# Patient Record
Sex: FEMALE | NOT HISPANIC OR LATINO | URBAN - METROPOLITAN AREA
[De-identification: names, ages, dates, MRNs, and addresses within clinical notes are randomized per-mention and may not be internally consistent; named-entity substitution may affect disease eponyms.]

---

## 2017-03-31 ENCOUNTER — OUTPATIENT (OUTPATIENT)
Dept: OUTPATIENT SERVICES | Facility: HOSPITAL | Age: 82
LOS: 1 days | End: 2017-03-31
Payer: MEDICARE

## 2017-03-31 DIAGNOSIS — K08.9 DISORDER OF TEETH AND SUPPORTING STRUCTURES, UNSPECIFIED: ICD-10-CM

## 2017-03-31 PROCEDURE — D0140: CPT

## 2017-03-31 PROCEDURE — D0330: CPT

## 2017-04-03 DIAGNOSIS — Z01.20 ENCOUNTER FOR DENTAL EXAMINATION AND CLEANING WITHOUT ABNORMAL FINDINGS: ICD-10-CM

## 2017-04-05 ENCOUNTER — OUTPATIENT (OUTPATIENT)
Dept: OUTPATIENT SERVICES | Facility: HOSPITAL | Age: 82
LOS: 1 days | End: 2017-04-05
Payer: MEDICARE

## 2017-04-05 DIAGNOSIS — K08.9 DISORDER OF TEETH AND SUPPORTING STRUCTURES, UNSPECIFIED: ICD-10-CM

## 2017-04-05 DIAGNOSIS — K02.9 DENTAL CARIES, UNSPECIFIED: ICD-10-CM

## 2017-04-05 PROCEDURE — D7140: CPT

## 2017-04-07 ENCOUNTER — OUTPATIENT (OUTPATIENT)
Dept: OUTPATIENT SERVICES | Facility: HOSPITAL | Age: 82
LOS: 1 days | End: 2017-04-07

## 2017-04-07 DIAGNOSIS — K08.9 DISORDER OF TEETH AND SUPPORTING STRUCTURES, UNSPECIFIED: ICD-10-CM

## 2017-04-10 DIAGNOSIS — K08.109 COMPLETE LOSS OF TEETH, UNSPECIFIED CAUSE, UNSPECIFIED CLASS: ICD-10-CM

## 2017-04-21 ENCOUNTER — OUTPATIENT (OUTPATIENT)
Dept: OUTPATIENT SERVICES | Facility: HOSPITAL | Age: 82
LOS: 1 days | End: 2017-04-21

## 2017-04-21 DIAGNOSIS — K08.9 DISORDER OF TEETH AND SUPPORTING STRUCTURES, UNSPECIFIED: ICD-10-CM

## 2017-04-24 DIAGNOSIS — K08.109 COMPLETE LOSS OF TEETH, UNSPECIFIED CAUSE, UNSPECIFIED CLASS: ICD-10-CM

## 2017-05-08 ENCOUNTER — OUTPATIENT (OUTPATIENT)
Dept: OUTPATIENT SERVICES | Facility: HOSPITAL | Age: 82
LOS: 1 days | End: 2017-05-08

## 2017-05-08 DIAGNOSIS — K08.9 DISORDER OF TEETH AND SUPPORTING STRUCTURES, UNSPECIFIED: ICD-10-CM

## 2017-05-10 DIAGNOSIS — K08.109 COMPLETE LOSS OF TEETH, UNSPECIFIED CAUSE, UNSPECIFIED CLASS: ICD-10-CM

## 2018-07-01 ENCOUNTER — OUTPATIENT (OUTPATIENT)
Dept: OUTPATIENT SERVICES | Facility: HOSPITAL | Age: 83
LOS: 1 days | End: 2018-07-01
Payer: MEDICARE

## 2018-07-01 PROCEDURE — G9001: CPT

## 2018-07-17 ENCOUNTER — EMERGENCY (EMERGENCY)
Facility: HOSPITAL | Age: 83
LOS: 1 days | Discharge: HOME | End: 2018-07-17
Attending: EMERGENCY MEDICINE
Payer: MEDICARE

## 2018-07-17 VITALS
SYSTOLIC BLOOD PRESSURE: 146 MMHG | RESPIRATION RATE: 16 BRPM | TEMPERATURE: 98 F | DIASTOLIC BLOOD PRESSURE: 78 MMHG | HEART RATE: 80 BPM | WEIGHT: 121.92 LBS | OXYGEN SATURATION: 98 % | HEIGHT: 65 IN

## 2018-07-17 VITALS
RESPIRATION RATE: 20 BRPM | TEMPERATURE: 98 F | DIASTOLIC BLOOD PRESSURE: 67 MMHG | SYSTOLIC BLOOD PRESSURE: 140 MMHG | HEART RATE: 84 BPM | OXYGEN SATURATION: 97 %

## 2018-07-17 LAB
ALBUMIN SERPL ELPH-MCNC: 4.2 G/DL — SIGNIFICANT CHANGE UP (ref 3.3–5)
ALP SERPL-CCNC: 62 U/L — SIGNIFICANT CHANGE UP (ref 40–120)
ALT FLD-CCNC: 20 U/L — SIGNIFICANT CHANGE UP (ref 10–45)
APPEARANCE UR: CLEAR — SIGNIFICANT CHANGE UP
APTT BLD: 28.9 SEC — SIGNIFICANT CHANGE UP (ref 27.5–37.4)
AST SERPL-CCNC: 23 U/L — SIGNIFICANT CHANGE UP (ref 10–40)
BASOPHILS # BLD AUTO: 0.1 K/UL — SIGNIFICANT CHANGE UP (ref 0–0.2)
BASOPHILS NFR BLD AUTO: 1.6 % — SIGNIFICANT CHANGE UP (ref 0–2)
BILIRUB SERPL-MCNC: 0.4 MG/DL — SIGNIFICANT CHANGE UP (ref 0.2–1.2)
BILIRUB UR-MCNC: NEGATIVE — SIGNIFICANT CHANGE UP
BLD GP AB SCN SERPL QL: NEGATIVE — SIGNIFICANT CHANGE UP
BUN SERPL-MCNC: 18 MG/DL — SIGNIFICANT CHANGE UP (ref 7–23)
CALCIUM SERPL-MCNC: 9 MG/DL — SIGNIFICANT CHANGE UP (ref 8.4–10.5)
CHLORIDE SERPL-SCNC: 108 MMOL/L — SIGNIFICANT CHANGE UP (ref 96–108)
CO2 SERPL-SCNC: 23 MMOL/L — SIGNIFICANT CHANGE UP (ref 22–31)
COLOR SPEC: COLORLESS — SIGNIFICANT CHANGE UP
CREAT SERPL-MCNC: 1.13 MG/DL — SIGNIFICANT CHANGE UP (ref 0.5–1.3)
DIFF PNL FLD: NEGATIVE — SIGNIFICANT CHANGE UP
EOSINOPHIL # BLD AUTO: 0.1 K/UL — SIGNIFICANT CHANGE UP (ref 0–0.5)
EOSINOPHIL NFR BLD AUTO: 1.3 % — SIGNIFICANT CHANGE UP (ref 0–6)
EPI CELLS # UR: SIGNIFICANT CHANGE UP /HPF
GLUCOSE SERPL-MCNC: 119 MG/DL — HIGH (ref 70–99)
GLUCOSE UR QL: NEGATIVE — SIGNIFICANT CHANGE UP
HCT VFR BLD CALC: 35.9 % — SIGNIFICANT CHANGE UP (ref 34.5–45)
HGB BLD-MCNC: 12.6 G/DL — SIGNIFICANT CHANGE UP (ref 11.5–15.5)
INR BLD: 1.01 RATIO — SIGNIFICANT CHANGE UP (ref 0.88–1.16)
KETONES UR-MCNC: NEGATIVE — SIGNIFICANT CHANGE UP
LEUKOCYTE ESTERASE UR-ACNC: NEGATIVE — SIGNIFICANT CHANGE UP
LYMPHOCYTES # BLD AUTO: 1.2 K/UL — SIGNIFICANT CHANGE UP (ref 1–3.3)
LYMPHOCYTES # BLD AUTO: 26.4 % — SIGNIFICANT CHANGE UP (ref 13–44)
MCHC RBC-ENTMCNC: 32.6 PG — SIGNIFICANT CHANGE UP (ref 27–34)
MCHC RBC-ENTMCNC: 35.2 GM/DL — SIGNIFICANT CHANGE UP (ref 32–36)
MCV RBC AUTO: 92.8 FL — SIGNIFICANT CHANGE UP (ref 80–100)
MONOCYTES # BLD AUTO: 0.3 K/UL — SIGNIFICANT CHANGE UP (ref 0–0.9)
MONOCYTES NFR BLD AUTO: 6.2 % — SIGNIFICANT CHANGE UP (ref 2–14)
NEUTROPHILS # BLD AUTO: 2.9 K/UL — SIGNIFICANT CHANGE UP (ref 1.8–7.4)
NEUTROPHILS NFR BLD AUTO: 64.6 % — SIGNIFICANT CHANGE UP (ref 43–77)
NITRITE UR-MCNC: NEGATIVE — SIGNIFICANT CHANGE UP
PH UR: 7 — SIGNIFICANT CHANGE UP (ref 5–8)
PLATELET # BLD AUTO: 219 K/UL — SIGNIFICANT CHANGE UP (ref 150–400)
POTASSIUM SERPL-MCNC: 4.9 MMOL/L — SIGNIFICANT CHANGE UP (ref 3.5–5.3)
POTASSIUM SERPL-SCNC: 4.9 MMOL/L — SIGNIFICANT CHANGE UP (ref 3.5–5.3)
PROT SERPL-MCNC: 6.7 G/DL — SIGNIFICANT CHANGE UP (ref 6–8.3)
PROT UR-MCNC: NEGATIVE — SIGNIFICANT CHANGE UP
PROTHROM AB SERPL-ACNC: 10.9 SEC — SIGNIFICANT CHANGE UP (ref 9.8–12.7)
RBC # BLD: 3.87 M/UL — SIGNIFICANT CHANGE UP (ref 3.8–5.2)
RBC # FLD: 12.2 % — SIGNIFICANT CHANGE UP (ref 10.3–14.5)
RH IG SCN BLD-IMP: POSITIVE — SIGNIFICANT CHANGE UP
SODIUM SERPL-SCNC: 143 MMOL/L — SIGNIFICANT CHANGE UP (ref 135–145)
SP GR SPEC: 1.01 — LOW (ref 1.01–1.02)
UROBILINOGEN FLD QL: NEGATIVE — SIGNIFICANT CHANGE UP
WBC # BLD: 4.5 K/UL — SIGNIFICANT CHANGE UP (ref 3.8–10.5)
WBC # FLD AUTO: 4.5 K/UL — SIGNIFICANT CHANGE UP (ref 3.8–10.5)

## 2018-07-17 PROCEDURE — 85610 PROTHROMBIN TIME: CPT

## 2018-07-17 PROCEDURE — 93010 ELECTROCARDIOGRAM REPORT: CPT

## 2018-07-17 PROCEDURE — 74177 CT ABD & PELVIS W/CONTRAST: CPT | Mod: 26

## 2018-07-17 PROCEDURE — 85730 THROMBOPLASTIN TIME PARTIAL: CPT

## 2018-07-17 PROCEDURE — 71260 CT THORAX DX C+: CPT

## 2018-07-17 PROCEDURE — 72125 CT NECK SPINE W/O DYE: CPT

## 2018-07-17 PROCEDURE — 99284 EMERGENCY DEPT VISIT MOD MDM: CPT | Mod: 25,GC

## 2018-07-17 PROCEDURE — 74177 CT ABD & PELVIS W/CONTRAST: CPT

## 2018-07-17 PROCEDURE — 86900 BLOOD TYPING SEROLOGIC ABO: CPT

## 2018-07-17 PROCEDURE — 72125 CT NECK SPINE W/O DYE: CPT | Mod: 26

## 2018-07-17 PROCEDURE — 86901 BLOOD TYPING SEROLOGIC RH(D): CPT

## 2018-07-17 PROCEDURE — 86850 RBC ANTIBODY SCREEN: CPT

## 2018-07-17 PROCEDURE — 71260 CT THORAX DX C+: CPT | Mod: 26

## 2018-07-17 PROCEDURE — 70450 CT HEAD/BRAIN W/O DYE: CPT

## 2018-07-17 PROCEDURE — 93005 ELECTROCARDIOGRAM TRACING: CPT

## 2018-07-17 PROCEDURE — 70450 CT HEAD/BRAIN W/O DYE: CPT | Mod: 26

## 2018-07-17 PROCEDURE — 70486 CT MAXILLOFACIAL W/O DYE: CPT | Mod: 26

## 2018-07-17 PROCEDURE — 70486 CT MAXILLOFACIAL W/O DYE: CPT

## 2018-07-17 PROCEDURE — 81001 URINALYSIS AUTO W/SCOPE: CPT

## 2018-07-17 PROCEDURE — 80053 COMPREHEN METABOLIC PANEL: CPT

## 2018-07-17 PROCEDURE — 99284 EMERGENCY DEPT VISIT MOD MDM: CPT | Mod: 25

## 2018-07-17 PROCEDURE — 85027 COMPLETE CBC AUTOMATED: CPT

## 2018-07-17 PROCEDURE — 96374 THER/PROPH/DIAG INJ IV PUSH: CPT | Mod: XU

## 2018-07-17 RX ORDER — ACETAMINOPHEN 500 MG
1000 TABLET ORAL ONCE
Qty: 0 | Refills: 0 | Status: COMPLETED | OUTPATIENT
Start: 2018-07-17 | End: 2018-07-17

## 2018-07-17 RX ADMIN — Medication 1000 MILLIGRAM(S): at 11:00

## 2018-07-17 RX ADMIN — Medication 400 MILLIGRAM(S): at 10:28

## 2018-07-17 NOTE — ED PROVIDER NOTE - OBJECTIVE STATEMENT
83F w/out pmh p/w mechanical fall - was at home this morning when was walking along a curve and tripped, fell forward - no dizzy/sob/chest pain prior - onto chin and R chest.  ... 83F w/out pmh p/w mechanical fall - was at home this morning when was walking along a curve and tripped, fell forward - no dizzy/sob/chest pain prior - onto chin and R chest. Not on blood thinners, didn't take pain meds today. Was seen 83F w/out pmh p/w mechanical fall - was at home this morning when was walking along a curve and tripped, fell forward - no dizzy/sob/chest pain prior - onto chin and R chest. Not on blood thinners, didn't take pain meds today. Pt's family did not witness fall but immediately went to patient's side - don't think she lost consciousness. was alert and talking immediatley after. No preceding chest pain, diaphoresis, dizziness. Currently reports pain in L jaw and R low ribs / flank.    Pt primarily farsi speaking - request family at bedside to translate 83F w/out pmh p/w mechanical fall - was at home this morning when was walking along a curve and tripped, fell forward - no dizzy/sob/chest pain prior - onto chin and R chest. Not on blood thinners, didn't take pain meds today. Pt's family did not witness fall but immediately went to patient's side - don't think she lost consciousness. was alert and talking immediatley after. No preceding chest pain, diaphoresis, dizziness. Currently reports pain in L jaw and R low ribs / flank.    Pt primarily farsi speaking - request family at bedside to translate    PCP: Dr. Carlos Alberto Andrews [phone] 83F w/out pmh p/w mechanical fall - was at home this morning when was walking along a curve and tripped, fell forward - no dizzy/sob/chest pain prior - onto chin and R chest. Not on blood thinners, didn't take pain meds today. Pt's family did not witness fall but immediately went to patient's side - don't think she lost consciousness. was alert and talking immediately after. No preceding chest pain, diaphoresis, dizziness. Currently reports pain in L jaw and R low ribs / flank.    Pt primarily farsi speaking - request family at bedside to translate    PCP: Dr. Carlos Alberto Andrews [phone]

## 2018-07-17 NOTE — ED PROVIDER NOTE - PROGRESS NOTE DETAILS
Brett Vidal PGY2: d/w family CT results; paged plastics/face Brett Vidal PGY2: d/w plastics will see patient Results of CT d/w family, including incidental finding of lung mass, lymph node.  Recommended outpatient f/u, may need biopsy or other/further evaluation.  Results also d/w Dr Andrews (patient's PCP) who is aware now of the results, copy of CT to be provided to patient to bring to PCP, and he will see her on Wed or Thursday this week and arrange f/u. Wanda

## 2018-07-17 NOTE — ED ADULT NURSE NOTE - DISCHARGE TEACHING
Genevieve GAMBLE, pt verbalizes understanding to f/u with PCP and Plastics and return to ED for any worsening symptoms.

## 2018-07-17 NOTE — ED ADULT NURSE REASSESSMENT NOTE - NS ED NURSE REASSESS COMMENT FT1
Pt AAOx4, NAD, resting comfortably in bed with family at bedside. Pt c/o same L jaw and R rib pain. Pt denies headache, dizziness, chest pain, SOB, abdominal pain, n/v/d, fevers, chills, weakness at this time. Pt discharged as per MD, IV removed as per MD, pt wheeled out of ED by family.

## 2018-07-17 NOTE — ED ADULT NURSE NOTE - CHPI ED SYMPTOMS NEG
no deformity/no numbness/no vomiting/no tingling/no loss of consciousness/no fever/no bleeding/no confusion

## 2018-07-17 NOTE — ED PROVIDER NOTE - PHYSICAL EXAMINATION
*GEN:   uncomfortable, in pain, AOx3    ///    *EYES:   pupils equally round and reactive to light, extra-occular movements intact    ///    *HEENT:   airway patent, moist mucosal membranes, no chipped teeth, full ROM neck w/out midline tenderness to palpation, no goldman sign, no septal hematoma or deviation, no maxillary/mandibular mobility    ///    *CV:   regular rate and rhythm    ///    *RESP:   clear to auscultation bilaterally, tender to palpation R lower ribs    ///    *ABD:   soft, non-tender    ///    *:   no cva/flank tenderness    ///    *MSK:   no pelvic mobility, no MSK tenderness or limited ROM across bilateral upper and lower extremities    ///    *SKIN:  1.5cm linear laceration noted under chin    ///    *NEURO:   AOx3, cranial nerves intact throughout, no focal weakness/loss of sensation, no pronator drift, finger/nose normal

## 2018-07-17 NOTE — H&P ADULT - HISTORY OF PRESENT ILLNESS
82 yo F with no significant PMHx who presents to the ED c/o L sided facial pain s/p mechanical fall in home this morning at 0800. Fall was unwitnessed. Pt states she does not remember how she fell. Family members immediately went to pt after the fall and states pt was alert and aware of her surroundings. Pt c/o L sided facial pain and inability to open mouth d/t pain. C/o R sided pain along the lower ribs. Denies HA, dizziness, blurry or double vision. Able to speak without difficulty. Denies changes in her occlusion.

## 2018-07-17 NOTE — H&P ADULT - NSHPPHYSICALEXAM_GEN_ALL_CORE
GEN: in NAD, A&Ox3  HEENT: CN2-12 intact, EOMI, PERRLA, dentures in place, occlusion stable. TTP over L TMJ, no step-offs. Steri strips in place over R chin laceration, minimal swelling noted. Mouth opening limited by pain.

## 2018-07-17 NOTE — H&P ADULT - ASSESSMENT
A: This is a 84 yo F with no significant PMHx who presents with L sided facial pain s/p mechanical fall this AM. Found to have Left nondisplaced condylar head fracture on CT.     P:   - Soft diet until follow-up with Dr. Starks   - Pain control PRN  - Follow-up with Dr. Starks in 2wks     Case and imaging reviewed with Dr. Starks

## 2018-07-17 NOTE — ED PROVIDER NOTE - ATTENDING CONTRIBUTION TO CARE
82 y/o F with no significant PMH (denies chronic medical conditions) presenting to the ED s/p mechanical trip/fall, onto face and right side, hit left jaw and right side of chest abdomen on curb.  No LOC, no n/v.  C/o pain in the left jaw and right chest / side.  Having difficulty opening mouth and talking; no difficulty swallowing spit and no bleeding from mouth, no difficulty breathing.    On exam, appears stated age, in NAD, afebile, not hypotensive or tachycardic.  Able to speak but unable to fully open mouth and with significant tenderness along left mandible from TMJ to angle, with mild overlying swelling, no abrasions/lacerations over angle of mandible or TMJ.  Chin laceration 1.5 cm horizontal, very superficial.  Under EOMI, no tenderness around orbit b/l.  Neck supple, no cervical tenderness.  Lungs CTABL.  Cardiac nrl s1s2 rrr no mgr. Abdomen soft nt/nd.  +R chest wall lateral tenderness.      CT Head/c-spine/max/face, chest, and abdomen/pelvis obtained, given injuries, age.  L mandibular fracture noted, corresponding to area of pain.  Plastics/face consulted, recommended outpatient f/u, soft diet.  No rib fractures or intra-abdominal organ injuries noted, and patient significantly improving; stable for dc.    Of note, incidental finding of left lung mass, d/w patient and family results, called pcp with results of imaging and have arranged outpatient f/u for further diagnostic w/u.

## 2018-07-17 NOTE — CONSULT NOTE ADULT - SUBJECTIVE AND OBJECTIVE BOX
84 yo F with no significant PMHx who presents to the ED c/o L sided facial pain s/p mechanical fall in home this morning at 0800. Fall was unwitnessed. Pt states she does not remember how she fell. Family members immediately went to pt after the fall and states pt was alert and aware of her surroundings. Pt c/o L sided facial pain and inability to open mouth d/t pain. C/o R sided pain along the lower ribs. Denies HA, dizziness, blurry or double vision. Able to speak without difficulty. Denies changes in her occlusion.        Review of Systems:  Other Review of Systems: All other review of systems negative, except as noted in HPI	      Allergies and Intolerances:        Allergies:  	No Known Allergies:     Home Medications:   * Outpatient Medication Status not yet specified    Patient History:    Past Medical History:  No pertinent past medical history.     Past Surgical History:  No significant past surgical history.     Family History:  No pertinent family history in first degree relatives.     Social History:  Social History (marital status, living situation, occupation, tobacco use, alcohol and drug use, and sexual history): Denies smoking, EtOH use	    Vital Signs Last 24 Hrs  T(C): 36.9 (17 Jul 2018 09:06), Max: 36.9 (17 Jul 2018 08:55)  T(F): 98.4 (17 Jul 2018 09:06), Max: 98.4 (17 Jul 2018 08:55)  HR: 77 (17 Jul 2018 09:06) (77 - 80)  BP: 149/80 (17 Jul 2018 09:06) (146/78 - 149/80)  BP(mean): --  ABP: --  ABP(mean): --  RR: 18 (17 Jul 2018 09:06) (16 - 18)  SpO2: 98% (17 Jul 2018 09:06) (98% - 98%)    Physical Exam:  Physical Exam: GEN: in NAD, A&Ox3 HEENT: CN2-12 intact, EOMI, PERRLA, dentures in place, occlusion stable. TTP over L TMJ, no step-offs. Steri strips in place over R chin laceration, minimal swelling noted. Mouth opening limited by pain.	    Labs and Results:  Labs, Radiology, Cardiology, and Other Results: CT Maxillofacial:   IMPRESSION:   On the maxillofacial CT, a nondisplaced fracture involves the left  condylar head.

## 2018-07-17 NOTE — ED PROVIDER NOTE - MEDICAL DECISION MAKING DETAILS
83F s/p [facility] fall w/ L jaw / R chest pain; will check trauma CT series, labs, sx relief, reassess

## 2018-07-17 NOTE — ED ADULT NURSE NOTE - OBJECTIVE STATEMENT
82 y/o F, no PMH, no medications, BIBA from home with family c/o trip and fall. Pt reports she was walking, tripped over a shoe, fell forward onto her chin, unwitnessed, pt denies LOC, remembers entire event, son was at her side shortly after, assisted her to couch. Pt denies feeling any sx prior to fall. Pt c/o L chin/jaw pain with L chin abrasion, family controlled bleeding, cleaned abrasion and applied band-aid, no active bleeding at this time. Pt also c/o R rib pain with movement, inspiration, no bruising noted at this time. Pt has small bruise to R knee with mild TTP, full ROM to RLE noted. Pt denies headache, dizziness, chest pain, palpitations, SOB, abdominal pain, n/v/d, fevers, chills at this time. Pt Joy independently. Family at bedside, safety maintained. 82 y/o F, no PMH, no medications, BIBA from home with family c/o trip and fall. Pt reports she was walking, tripped over a shoe, fell forward onto her chin, unwitnessed, pt denies LOC, remembers entire event, son was at her side shortly after, assisted her to couch. Pt denies feeling any sx prior to fall. Pt c/o L chin/jaw pain with L chin lac, approx 1 inch, family controlled bleeding, cleaned laceration and applied band-aid, no active bleeding at this time. Pt also c/o R rib pain with movement, inspiration, no bruising noted at this time. Pt has small bruise to R knee with mild TTP, full ROM to RLE noted. Pt denies headache, dizziness, chest pain, palpitations, SOB, abdominal pain, n/v/d, fevers, chills at this time. Pt Joy independently. Family at bedside, safety maintained.

## 2018-07-17 NOTE — ED PROVIDER NOTE - CARE PLAN
Principal Discharge DX:	Jaw fracture Principal Discharge DX:	Jaw fracture  Goal:	left non-displaced condylar fracture of mandible

## 2018-07-17 NOTE — H&P ADULT - NSHPLABSRESULTS_GEN_ALL_CORE
CT Maxillofacial:    IMPRESSION:    On the maxillofacial CT, a nondisplaced fracture involves the left   condylar head.

## 2018-07-23 DIAGNOSIS — Z71.89 OTHER SPECIFIED COUNSELING: ICD-10-CM

## 2018-07-31 PROBLEM — Z00.00 ENCOUNTER FOR PREVENTIVE HEALTH EXAMINATION: Noted: 2018-07-31
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
## 2018-08-09 ENCOUNTER — APPOINTMENT (OUTPATIENT)
Dept: THORACIC SURGERY | Facility: CLINIC | Age: 83
End: 2018-08-09
Payer: MEDICARE

## 2018-08-09 ENCOUNTER — APPOINTMENT (OUTPATIENT)
Dept: PULMONOLOGY | Facility: CLINIC | Age: 83
End: 2018-08-09
Payer: MEDICARE

## 2018-08-09 VITALS
SYSTOLIC BLOOD PRESSURE: 160 MMHG | OXYGEN SATURATION: 99 % | HEART RATE: 95 BPM | DIASTOLIC BLOOD PRESSURE: 60 MMHG | RESPIRATION RATE: 18 BRPM | TEMPERATURE: 98 F

## 2018-08-09 VITALS
DIASTOLIC BLOOD PRESSURE: 70 MMHG | HEART RATE: 81 BPM | WEIGHT: 120.4 LBS | BODY MASS INDEX: 21.88 KG/M2 | TEMPERATURE: 98 F | SYSTOLIC BLOOD PRESSURE: 110 MMHG | OXYGEN SATURATION: 97 % | HEIGHT: 62.4 IN
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
## 2018-08-09 PROCEDURE — 99204 OFFICE O/P NEW MOD 45 MIN: CPT

## 2018-08-09 PROCEDURE — 99205 OFFICE O/P NEW HI 60 MIN: CPT

## 2018-08-10 PROBLEM — Z00.00 ENCOUNTER FOR PREVENTIVE HEALTH EXAMINATION: Status: ACTIVE | Noted: 2018-08-10

## 2018-08-14 ENCOUNTER — APPOINTMENT (OUTPATIENT)
Dept: OTOLARYNGOLOGY | Facility: CLINIC | Age: 83
End: 2018-08-14
Payer: MEDICAID

## 2018-08-14 DIAGNOSIS — D44.0 NEOPLASM OF UNCERTAIN BEHAVIOR OF THYROID GLAND: ICD-10-CM

## 2018-08-14 PROCEDURE — 99204 OFFICE O/P NEW MOD 45 MIN: CPT | Mod: 25

## 2018-08-14 PROCEDURE — 31575 DIAGNOSTIC LARYNGOSCOPY: CPT

## 2018-08-15 ENCOUNTER — APPOINTMENT (OUTPATIENT)
Dept: SURGERY | Facility: CLINIC | Age: 83
End: 2018-08-15

## 2018-08-20 ENCOUNTER — FORM ENCOUNTER (OUTPATIENT)
Age: 83
End: 2018-08-20

## 2018-08-21 ENCOUNTER — RESULT REVIEW (OUTPATIENT)
Age: 83
End: 2018-08-21

## 2018-08-21 ENCOUNTER — APPOINTMENT (OUTPATIENT)
Dept: PULMONOLOGY | Facility: HOSPITAL | Age: 83
End: 2018-08-21

## 2018-08-21 ENCOUNTER — OUTPATIENT (OUTPATIENT)
Dept: OUTPATIENT SERVICES | Facility: HOSPITAL | Age: 83
LOS: 1 days | Discharge: HOME | End: 2018-08-21
Payer: MEDICARE

## 2018-08-21 PROCEDURE — 31624 DX BRONCHOSCOPE/LAVAGE: CPT | Mod: LT

## 2018-08-21 PROCEDURE — 88342 IMHCHEM/IMCYTCHM 1ST ANTB: CPT

## 2018-08-21 PROCEDURE — 88305 TISSUE EXAM BY PATHOLOGIST: CPT

## 2018-08-21 PROCEDURE — 71045 X-RAY EXAM CHEST 1 VIEW: CPT | Mod: 26

## 2018-08-21 PROCEDURE — 31624 DX BRONCHOSCOPE/LAVAGE: CPT

## 2018-08-21 PROCEDURE — 71045 X-RAY EXAM CHEST 1 VIEW: CPT

## 2018-08-21 PROCEDURE — 31628 BRONCHOSCOPY/LUNG BX EACH: CPT

## 2018-08-21 PROCEDURE — 31653 BRONCH EBUS SAMPLNG 3/> NODE: CPT

## 2018-08-21 PROCEDURE — 31653 BRONCH EBUS SAMPLNG 3/> NODE: CPT | Mod: 50

## 2018-08-21 PROCEDURE — 88173 CYTOPATH EVAL FNA REPORT: CPT

## 2018-08-21 PROCEDURE — 31654 BRONCH EBUS IVNTJ PERPH LES: CPT

## 2018-08-21 PROCEDURE — 88112 CYTOPATH CELL ENHANCE TECH: CPT

## 2018-08-21 PROCEDURE — 88341 IMHCHEM/IMCYTCHM EA ADD ANTB: CPT

## 2018-08-22 ENCOUNTER — APPOINTMENT (OUTPATIENT)
Dept: ULTRASOUND IMAGING | Facility: HOSPITAL | Age: 83
End: 2018-08-22

## 2018-08-23 LAB
NON-GYNECOLOGICAL CYTOLOGY STUDY: SIGNIFICANT CHANGE UP
SURGICAL PATHOLOGY STUDY: SIGNIFICANT CHANGE UP

## 2018-09-04 ENCOUNTER — APPOINTMENT (OUTPATIENT)
Dept: SURGERY | Facility: CLINIC | Age: 83
End: 2018-09-04
Payer: MEDICARE

## 2018-09-04 VITALS
WEIGHT: 122 LBS | HEART RATE: 80 BPM | SYSTOLIC BLOOD PRESSURE: 128 MMHG | DIASTOLIC BLOOD PRESSURE: 73 MMHG | BODY MASS INDEX: 21.62 KG/M2 | HEIGHT: 63 IN

## 2018-09-04 PROCEDURE — 99204 OFFICE O/P NEW MOD 45 MIN: CPT

## 2018-09-04 PROCEDURE — 36415 COLL VENOUS BLD VENIPUNCTURE: CPT

## 2018-09-05 ENCOUNTER — APPOINTMENT (OUTPATIENT)
Dept: ULTRASOUND IMAGING | Facility: HOSPITAL | Age: 83
End: 2018-09-05

## 2018-09-05 LAB
THYROGLOB AB SERPL-ACNC: <20 IU/ML
THYROPEROXIDASE AB SERPL IA-ACNC: <10 IU/ML

## 2018-09-06 ENCOUNTER — RESULT REVIEW (OUTPATIENT)
Age: 83
End: 2018-09-06

## 2018-09-06 ENCOUNTER — OTHER (OUTPATIENT)
Age: 83
End: 2018-09-06

## 2018-09-06 LAB
T3 SERPL-MCNC: 92 NG/DL
T4 FREE SERPL-MCNC: 1.2 NG/DL
TSH SERPL-ACNC: 1.83 UIU/ML

## 2018-09-13 ENCOUNTER — OUTPATIENT (OUTPATIENT)
Dept: OUTPATIENT SERVICES | Facility: HOSPITAL | Age: 83
LOS: 1 days | End: 2018-09-13
Payer: MEDICARE

## 2018-09-13 ENCOUNTER — APPOINTMENT (OUTPATIENT)
Dept: THORACIC SURGERY | Facility: CLINIC | Age: 83
End: 2018-09-13
Payer: MEDICARE

## 2018-09-13 VITALS
OXYGEN SATURATION: 97 % | DIASTOLIC BLOOD PRESSURE: 67 MMHG | BODY MASS INDEX: 21.61 KG/M2 | TEMPERATURE: 97.6 F | RESPIRATION RATE: 19 BRPM | HEART RATE: 88 BPM | SYSTOLIC BLOOD PRESSURE: 152 MMHG | WEIGHT: 122 LBS

## 2018-09-13 DIAGNOSIS — R91.1 SOLITARY PULMONARY NODULE: ICD-10-CM

## 2018-09-13 PROCEDURE — 94010 BREATHING CAPACITY TEST: CPT

## 2018-09-13 PROCEDURE — 94010 BREATHING CAPACITY TEST: CPT | Mod: 26

## 2018-09-13 PROCEDURE — 99214 OFFICE O/P EST MOD 30 MIN: CPT

## 2018-09-18 ENCOUNTER — APPOINTMENT (OUTPATIENT)
Dept: THORACIC SURGERY | Facility: CLINIC | Age: 83
End: 2018-09-18
Payer: MEDICARE

## 2018-09-18 VITALS
WEIGHT: 122 LBS | HEIGHT: 63 IN | RESPIRATION RATE: 15 BRPM | SYSTOLIC BLOOD PRESSURE: 144 MMHG | HEART RATE: 83 BPM | BODY MASS INDEX: 21.62 KG/M2 | OXYGEN SATURATION: 96 % | DIASTOLIC BLOOD PRESSURE: 72 MMHG

## 2018-09-18 PROCEDURE — 99215 OFFICE O/P EST HI 40 MIN: CPT

## 2018-10-08 VITALS
TEMPERATURE: 99 F | HEIGHT: 63 IN | SYSTOLIC BLOOD PRESSURE: 144 MMHG | OXYGEN SATURATION: 97 % | HEART RATE: 82 BPM | WEIGHT: 120.81 LBS | RESPIRATION RATE: 16 BRPM | DIASTOLIC BLOOD PRESSURE: 65 MMHG

## 2018-10-09 ENCOUNTER — APPOINTMENT (OUTPATIENT)
Dept: THORACIC SURGERY | Facility: HOSPITAL | Age: 83
End: 2018-10-09
Payer: MEDICARE

## 2018-10-09 ENCOUNTER — RESULT REVIEW (OUTPATIENT)
Age: 83
End: 2018-10-09

## 2018-10-09 ENCOUNTER — OTHER (OUTPATIENT)
Age: 83
End: 2018-10-09

## 2018-10-09 ENCOUNTER — INPATIENT (INPATIENT)
Facility: HOSPITAL | Age: 83
LOS: 6 days | Discharge: HOME HEALTH | DRG: 164 | End: 2018-10-16
Attending: SPECIALIST | Admitting: SPECIALIST
Payer: MEDICARE

## 2018-10-09 DIAGNOSIS — R91.1 SOLITARY PULMONARY NODULE: ICD-10-CM

## 2018-10-09 DIAGNOSIS — N60.02 SOLITARY CYST OF LEFT BREAST: Chronic | ICD-10-CM

## 2018-10-09 LAB
ALBUMIN SERPL ELPH-MCNC: 3.9 G/DL — SIGNIFICANT CHANGE UP (ref 3.3–5)
ALP SERPL-CCNC: 69 U/L — SIGNIFICANT CHANGE UP (ref 40–120)
ALT FLD-CCNC: 21 U/L — SIGNIFICANT CHANGE UP (ref 10–45)
ANION GAP SERPL CALC-SCNC: 15 MMOL/L — SIGNIFICANT CHANGE UP (ref 5–17)
APTT BLD: 27.6 SEC — SIGNIFICANT CHANGE UP (ref 27.5–37.4)
AST SERPL-CCNC: 22 U/L — SIGNIFICANT CHANGE UP (ref 10–40)
BILIRUB SERPL-MCNC: 0.3 MG/DL — SIGNIFICANT CHANGE UP (ref 0.2–1.2)
BLD GP AB SCN SERPL QL: NEGATIVE — SIGNIFICANT CHANGE UP
BUN SERPL-MCNC: 24 MG/DL — HIGH (ref 7–23)
CALCIUM SERPL-MCNC: 9 MG/DL — SIGNIFICANT CHANGE UP (ref 8.4–10.5)
CHLORIDE SERPL-SCNC: 100 MMOL/L — SIGNIFICANT CHANGE UP (ref 96–108)
CO2 SERPL-SCNC: 20 MMOL/L — LOW (ref 22–31)
CREAT SERPL-MCNC: 0.94 MG/DL — SIGNIFICANT CHANGE UP (ref 0.5–1.3)
GAS PNL BLDA: SIGNIFICANT CHANGE UP
GLUCOSE SERPL-MCNC: 234 MG/DL — HIGH (ref 70–99)
HCT VFR BLD CALC: 33.2 % — LOW (ref 34.5–45)
HGB BLD-MCNC: 10.9 G/DL — LOW (ref 11.5–15.5)
INR BLD: 1.07 — SIGNIFICANT CHANGE UP (ref 0.88–1.16)
MAGNESIUM SERPL-MCNC: 1.8 MG/DL — SIGNIFICANT CHANGE UP (ref 1.6–2.6)
MCHC RBC-ENTMCNC: 29.8 PG — SIGNIFICANT CHANGE UP (ref 27–34)
MCHC RBC-ENTMCNC: 32.8 G/DL — SIGNIFICANT CHANGE UP (ref 32–36)
MCV RBC AUTO: 90.7 FL — SIGNIFICANT CHANGE UP (ref 80–100)
PHOSPHATE SERPL-MCNC: 5.1 MG/DL — HIGH (ref 2.5–4.5)
PLATELET # BLD AUTO: 284 K/UL — SIGNIFICANT CHANGE UP (ref 150–400)
POTASSIUM SERPL-MCNC: 4.6 MMOL/L — SIGNIFICANT CHANGE UP (ref 3.5–5.3)
POTASSIUM SERPL-SCNC: 4.6 MMOL/L — SIGNIFICANT CHANGE UP (ref 3.5–5.3)
PROT SERPL-MCNC: 6.6 G/DL — SIGNIFICANT CHANGE UP (ref 6–8.3)
PROTHROM AB SERPL-ACNC: 11.9 SEC — SIGNIFICANT CHANGE UP (ref 9.8–12.7)
RBC # BLD: 3.66 M/UL — LOW (ref 3.8–5.2)
RBC # FLD: 13.5 % — SIGNIFICANT CHANGE UP (ref 10.3–16.9)
RH IG SCN BLD-IMP: POSITIVE — SIGNIFICANT CHANGE UP
SODIUM SERPL-SCNC: 135 MMOL/L — SIGNIFICANT CHANGE UP (ref 135–145)
WBC # BLD: 13 K/UL — HIGH (ref 3.8–10.5)
WBC # FLD AUTO: 13 K/UL — HIGH (ref 3.8–10.5)

## 2018-10-09 PROCEDURE — S2900 ROBOTIC SURGICAL SYSTEM: CPT | Mod: NC

## 2018-10-09 PROCEDURE — 32663 THORACOSCOPY W/LOBECTOMY: CPT

## 2018-10-09 PROCEDURE — 32674 THORACOSCOPY LYMPH NODE EXC: CPT

## 2018-10-09 PROCEDURE — 99291 CRITICAL CARE FIRST HOUR: CPT

## 2018-10-09 PROCEDURE — 32663 THORACOSCOPY W/LOBECTOMY: CPT | Mod: 80

## 2018-10-09 PROCEDURE — 32501 REPAIR BRONCHUS ADD-ON: CPT | Mod: 80

## 2018-10-09 PROCEDURE — 71045 X-RAY EXAM CHEST 1 VIEW: CPT | Mod: 26

## 2018-10-09 PROCEDURE — 32674 THORACOSCOPY LYMPH NODE EXC: CPT | Mod: 80

## 2018-10-09 PROCEDURE — 32501 REPAIR BRONCHUS ADD-ON: CPT

## 2018-10-09 PROCEDURE — 39402 MEDIASTINOSCPY W/LMPH NOD BX: CPT

## 2018-10-09 PROCEDURE — 31622 DX BRONCHOSCOPE/WASH: CPT

## 2018-10-09 RX ORDER — ALBUTEROL 90 UG/1
2.5 AEROSOL, METERED ORAL EVERY 6 HOURS
Qty: 0 | Refills: 0 | Status: DISCONTINUED | OUTPATIENT
Start: 2018-10-09 | End: 2018-10-16

## 2018-10-09 RX ORDER — MAGNESIUM SULFATE 500 MG/ML
2 VIAL (ML) INJECTION ONCE
Qty: 0 | Refills: 0 | Status: COMPLETED | OUTPATIENT
Start: 2018-10-09 | End: 2018-10-09

## 2018-10-09 RX ORDER — SODIUM CHLORIDE 9 MG/ML
1000 INJECTION, SOLUTION INTRAVENOUS
Qty: 0 | Refills: 0 | Status: DISCONTINUED | OUTPATIENT
Start: 2018-10-09 | End: 2018-10-10

## 2018-10-09 RX ORDER — ACETAMINOPHEN 500 MG
1000 TABLET ORAL ONCE
Qty: 0 | Refills: 0 | Status: COMPLETED | OUTPATIENT
Start: 2018-10-09 | End: 2018-10-10

## 2018-10-09 RX ORDER — BUPIVACAINE 13.3 MG/ML
20 INJECTION, SUSPENSION, LIPOSOMAL INFILTRATION ONCE
Qty: 0 | Refills: 0 | Status: DISCONTINUED | OUTPATIENT
Start: 2018-10-09 | End: 2018-10-09

## 2018-10-09 RX ORDER — KETOROLAC TROMETHAMINE 30 MG/ML
15 SYRINGE (ML) INJECTION ONCE
Qty: 0 | Refills: 0 | Status: DISCONTINUED | OUTPATIENT
Start: 2018-10-09 | End: 2018-10-09

## 2018-10-09 RX ORDER — DOCUSATE SODIUM 100 MG
100 CAPSULE ORAL THREE TIMES A DAY
Qty: 0 | Refills: 0 | Status: DISCONTINUED | OUTPATIENT
Start: 2018-10-09 | End: 2018-10-16

## 2018-10-09 RX ORDER — IPRATROPIUM BROMIDE 0.2 MG/ML
500 SOLUTION, NON-ORAL INHALATION EVERY 6 HOURS
Qty: 0 | Refills: 0 | Status: DISCONTINUED | OUTPATIENT
Start: 2018-10-09 | End: 2018-10-16

## 2018-10-09 RX ORDER — CEFAZOLIN SODIUM 1 G
2000 VIAL (EA) INJECTION EVERY 8 HOURS
Qty: 0 | Refills: 0 | Status: COMPLETED | OUTPATIENT
Start: 2018-10-09 | End: 2018-10-10

## 2018-10-09 RX ORDER — LIDOCAINE 4 G/100G
1 CREAM TOPICAL DAILY
Qty: 0 | Refills: 0 | Status: DISCONTINUED | OUTPATIENT
Start: 2018-10-09 | End: 2018-10-16

## 2018-10-09 RX ADMIN — Medication 50 GRAM(S): at 22:36

## 2018-10-09 RX ADMIN — Medication 15 MILLIGRAM(S): at 23:59

## 2018-10-09 RX ADMIN — Medication 15 MILLIGRAM(S): at 22:36

## 2018-10-09 RX ADMIN — LIDOCAINE 1 PATCH: 4 CREAM TOPICAL at 22:45

## 2018-10-09 NOTE — H&P ADULT - NSHPPHYSICALEXAM_GEN_ALL_CORE
VS: BP: 144/65  HR: 82bpm  RR: 16 unlabored  O2 sat: 97% on RA    General: Patient sitting comfortably in chair, no acute distress     Neurological: Alert and oriented. No focal neurological deficits     Cardiovascular: S1S2, RRR, no murmurs appreciated on exam     Respiratory: Clear to ausculation bilaterally, no wheezes rales or rhonchi    Gastrointestinal: Abdomen soft, non tender, non distended     Extremities: Warm and well perfused. No peripheral edema or calf tenderness     Vascular: Peripheral pulses palpable bilaterally.

## 2018-10-09 NOTE — H&P ADULT - PMH
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

## 2018-10-09 NOTE — H&P ADULT - NSHPREVIEWOFSYSTEMS_GEN_ALL_CORE
Review of Systems  CONSTITUTIONAL:  Denies Fevers / chills, sweats, fatigue, weight loss, weight gain                                      NEURO:  Denies parathesias, seizures, syncope, confusion                                                                                EYES:  Denies Blurry vision, discharge, pain, loss of vision                                                                                    ENMT:  Denies Difficulty hearing, vertigo, dysphagia, epistaxis, recent dental work                                       CV:  Denies Chest pain, palpitations, GARCIA, orthopnea                                                                                          RESPIRATORY:  Denies Wheezing, SOB, cough / sputum, hemoptysis                                                                GI:  Denies Nausea, vommiting, diarrhea, constipation, melena, difficulty swallowing                                               : Denies Hematuria, dysuria, urgency, incontinence                                                                                         MUSKULOSKELETAL:  + jaw pain and rib pain s/p fall, L hand pain over the wrist palmar surface. Denies arthritis, joint swelling, muscle weakness                                                             SKIN/BREAST:  Denies rash, itching, luz marina loss, masses                                                                                            PSYCH:  Denies depresion, anxiety, suicidal ideation                                                                                               HEME/LYMPH:  Denies bruises easily, enlarged lymph nodes, tender lymph nodes                                        ENDOCRINE:  Denies cold intolerance, heat intolerance, polydipsia

## 2018-10-09 NOTE — PROGRESS NOTE ADULT - ASSESSMENT
82yo s/p Left upper lobectomy, mediastinal lymph node dissection.        Problems  1. Resp insufficiency   2. Post op pain     Plan   Neuro -- pain control  CVS - hemodynamic support  Pulm - Resp monitoring  chest tube management  GI - GI proph   - monitor UOP  Endo - glycemic control  Heme -SCDs for DVT proph  ID - periop antibiotics.       Critical post op.    Critical care time spent 30  min

## 2018-10-09 NOTE — H&P ADULT - HISTORY OF PRESENT ILLNESS
83 year old Female, non-smoker, with a PMHx significant for multiple mechanical falls, denies LOC, workup for recent mechanical fall incidentally revealed a left upper lobe lung nodule and hilar node lymphadenopathy. CT Ab/pelvis on 7/17/18 revealed a mass in left upper lobe, measuring 2.1cm, and enlarged left superior hilar node measuring 1.2cm x 0.9cm. PET CT 7/27/18 revealed spiculated 2.2cm nodule in left upper lobe, SUV of 8.4. Hypermetabolic bilateral hilar and mediastinal lymph nodes, two hypermetabolic foci in the right thyroid lobe. 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 Case discussed with Tumor Board, and referred to Dr. Hathaway for surgical evaluation. Patient deemed a good surgical candidate, and under the care of Dr. Hathaway, presents today for elective Left VATS, robotic assisted, left upper lobectomy, intraoperative frozen section with mediastinal lymph node dissection. Patient seen in same day holding area; Reports no changes to PMHx or medications since last seen by our team. Denies acute or current SOB, chest pain, palpitation, N/V/D, fever/chills, recent illness, or any other concerning symptoms.

## 2018-10-09 NOTE — PROGRESS NOTE ADULT - SUBJECTIVE AND OBJECTIVE BOX
OP Day:  s/p Left VATS Left upper lobectomy, mediastinal lymph node dissection   Case complicated by more than usual op time due to extensive adhesions per report.       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    ICU Vital Signs Last 24 Hrs  HR: 84 (10-09-18 @ 22:00) (84 - 90)  BP: 139/101 (10-09-18 @ 21:30) (139/101 - 139/101)  BP(mean): 118 (10-09-18 @ 21:30) (118 - 118)  ABP: 126/54 (10-09-18 @ 22:00) (126/54 - 140/58)  ABP(mean): 84 (10-09-18 @ 22:00) (84 - 94)  SpO2: 98% (10-09-18 @ 22:00) (93% - 98%)    I&O's Summary      ABG - ( 09 Oct 2018 21:48 )  pH: 7.34  /  pCO2: 41    /  pO2: 65    / HCO3: 22    / Base Excess: -4.1  /  SaO2: 92                        10.9   13.0  )-----------( 284      ( 09 Oct 2018 21:51 )             33.2     09 Oct 2018 21:51    135    |  100    |  24     ----------------------------<  234    4.6     |  20     |  0.94     Ca    9.0        09 Oct 2018 21:51  Phos  5.1       09 Oct 2018 21:51  Mg     1.8       09 Oct 2018 21:51    TPro  6.6    /  Alb  3.9    /  TBili  0.3    /  DBili  x      /  AST  22     /  ALT  21     /  AlkPhos  69     09 Oct 2018 21:51    PT/INR - ( 09 Oct 2018 21:51 )   PT: 11.9 sec;   INR: 1.07     PTT - ( 09 Oct 2018 21:51 )  PTT:27.6 sec    MEDICATIONS  (STANDING):  acetaminophen  IVPB .. 1000 milliGRAM(s) IV Intermittent once  ceFAZolin   IVPB 2000 milliGRAM(s) IV Intermittent every 8 hours  docusate sodium 100 milliGRAM(s) Oral three times a day  lidocaine   Patch 1 Patch Transdermal daily      PHYSICAL EXAM:  Gen : moderate distress   Respiratory: decreased in the bases  Cardiovascular: S1 and S2, RRR, no M/G/R  Gastrointestinal: BS+, soft, NT/ND  Extremities: No peripheral edema  Vascular: 2+ peripheral pulses  Neurological:  no focal deficits  Incision: clean dry/ no sign of infection

## 2018-10-09 NOTE — H&P ADULT - PROBLEM SELECTOR PLAN 1
Admit under Dr. Hathaway via same day surgery. Consent signed, placed on chart.  Risks/benefits reviewed, patient understands and agrees. T&S ordered and blood products placed on hold for OR.  To 9lachman  post-op.

## 2018-10-09 NOTE — H&P ADULT - ASSESSMENT
83 year old Female, non-smoker, with a PMHx significant for multiple mechanical falls, denies LOC, workup for recent mechanical fall incidentally revealed a left upper lobe lung nodule and hilar node lymphadenopathy. 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 Case discussed with Tumor Board, and referred to Dr. Hathaway for surgical evaluation. Patient deemed a good surgical candidate, and under the care of Dr. Hathaway, presents today for elective Left VATS, robotic assisted, left upper lobectomy, intraoperative frozen section with mediastinal lymph node dissection.

## 2018-10-10 LAB
ALBUMIN SERPL ELPH-MCNC: 3.8 G/DL — SIGNIFICANT CHANGE UP (ref 3.3–5)
ALP SERPL-CCNC: 56 U/L — SIGNIFICANT CHANGE UP (ref 40–120)
ALT FLD-CCNC: 16 U/L — SIGNIFICANT CHANGE UP (ref 10–45)
ANION GAP SERPL CALC-SCNC: 15 MMOL/L — SIGNIFICANT CHANGE UP (ref 5–17)
APTT BLD: 27.1 SEC — LOW (ref 27.5–37.4)
AST SERPL-CCNC: 16 U/L — SIGNIFICANT CHANGE UP (ref 10–40)
BILIRUB SERPL-MCNC: 0.2 MG/DL — SIGNIFICANT CHANGE UP (ref 0.2–1.2)
BUN SERPL-MCNC: 22 MG/DL — SIGNIFICANT CHANGE UP (ref 7–23)
CALCIUM SERPL-MCNC: 8.6 MG/DL — SIGNIFICANT CHANGE UP (ref 8.4–10.5)
CHLORIDE SERPL-SCNC: 99 MMOL/L — SIGNIFICANT CHANGE UP (ref 96–108)
CO2 SERPL-SCNC: 21 MMOL/L — LOW (ref 22–31)
CREAT SERPL-MCNC: 1.1 MG/DL — SIGNIFICANT CHANGE UP (ref 0.5–1.3)
GAS PNL BLDA: SIGNIFICANT CHANGE UP
GLUCOSE BLDC GLUCOMTR-MCNC: 153 MG/DL — HIGH (ref 70–99)
GLUCOSE SERPL-MCNC: 201 MG/DL — HIGH (ref 70–99)
HBA1C BLD-MCNC: 5.4 % — SIGNIFICANT CHANGE UP (ref 4–5.6)
HCT VFR BLD CALC: 28.9 % — LOW (ref 34.5–45)
HGB BLD-MCNC: 9.6 G/DL — LOW (ref 11.5–15.5)
INR BLD: 1.11 — SIGNIFICANT CHANGE UP (ref 0.88–1.16)
MAGNESIUM SERPL-MCNC: 2.8 MG/DL — HIGH (ref 1.6–2.6)
MCHC RBC-ENTMCNC: 30.3 PG — SIGNIFICANT CHANGE UP (ref 27–34)
MCHC RBC-ENTMCNC: 33.2 G/DL — SIGNIFICANT CHANGE UP (ref 32–36)
MCV RBC AUTO: 91.2 FL — SIGNIFICANT CHANGE UP (ref 80–100)
PHOSPHATE SERPL-MCNC: 4.4 MG/DL — SIGNIFICANT CHANGE UP (ref 2.5–4.5)
PLATELET # BLD AUTO: 251 K/UL — SIGNIFICANT CHANGE UP (ref 150–400)
POTASSIUM SERPL-MCNC: 4.4 MMOL/L — SIGNIFICANT CHANGE UP (ref 3.5–5.3)
POTASSIUM SERPL-SCNC: 4.4 MMOL/L — SIGNIFICANT CHANGE UP (ref 3.5–5.3)
PROT SERPL-MCNC: 5.7 G/DL — LOW (ref 6–8.3)
PROTHROM AB SERPL-ACNC: 12.4 SEC — SIGNIFICANT CHANGE UP (ref 9.8–12.7)
RBC # BLD: 3.17 M/UL — LOW (ref 3.8–5.2)
RBC # FLD: 13.3 % — SIGNIFICANT CHANGE UP (ref 10.3–16.9)
SODIUM SERPL-SCNC: 135 MMOL/L — SIGNIFICANT CHANGE UP (ref 135–145)
TSH SERPL-MCNC: 0.71 UIU/ML — SIGNIFICANT CHANGE UP (ref 0.35–4.94)
WBC # BLD: 9.4 K/UL — SIGNIFICANT CHANGE UP (ref 3.8–10.5)
WBC # FLD AUTO: 9.4 K/UL — SIGNIFICANT CHANGE UP (ref 3.8–10.5)

## 2018-10-10 PROCEDURE — 71045 X-RAY EXAM CHEST 1 VIEW: CPT | Mod: 26,76

## 2018-10-10 PROCEDURE — 99291 CRITICAL CARE FIRST HOUR: CPT

## 2018-10-10 RX ORDER — SENNA PLUS 8.6 MG/1
2 TABLET ORAL AT BEDTIME
Qty: 0 | Refills: 0 | Status: DISCONTINUED | OUTPATIENT
Start: 2018-10-10 | End: 2018-10-16

## 2018-10-10 RX ORDER — ONDANSETRON 8 MG/1
4 TABLET, FILM COATED ORAL ONCE
Qty: 0 | Refills: 0 | Status: COMPLETED | OUTPATIENT
Start: 2018-10-10 | End: 2018-10-10

## 2018-10-10 RX ORDER — PANTOPRAZOLE SODIUM 20 MG/1
40 TABLET, DELAYED RELEASE ORAL
Qty: 0 | Refills: 0 | Status: DISCONTINUED | OUTPATIENT
Start: 2018-10-10 | End: 2018-10-16

## 2018-10-10 RX ORDER — SODIUM CHLORIDE 9 MG/ML
500 INJECTION, SOLUTION INTRAVENOUS ONCE
Qty: 0 | Refills: 0 | Status: COMPLETED | OUTPATIENT
Start: 2018-10-10 | End: 2018-10-10

## 2018-10-10 RX ORDER — HYDROMORPHONE HYDROCHLORIDE 2 MG/ML
0.5 INJECTION INTRAMUSCULAR; INTRAVENOUS; SUBCUTANEOUS ONCE
Qty: 0 | Refills: 0 | Status: DISCONTINUED | OUTPATIENT
Start: 2018-10-10 | End: 2018-10-10

## 2018-10-10 RX ORDER — HEPARIN SODIUM 5000 [USP'U]/ML
5000 INJECTION INTRAVENOUS; SUBCUTANEOUS EVERY 8 HOURS
Qty: 0 | Refills: 0 | Status: DISCONTINUED | OUTPATIENT
Start: 2018-10-10 | End: 2018-10-16

## 2018-10-10 RX ORDER — ACETAMINOPHEN 500 MG
650 TABLET ORAL EVERY 6 HOURS
Qty: 0 | Refills: 0 | Status: DISCONTINUED | OUTPATIENT
Start: 2018-10-10 | End: 2018-10-16

## 2018-10-10 RX ORDER — IBUPROFEN 200 MG
400 TABLET ORAL EVERY 4 HOURS
Qty: 0 | Refills: 0 | Status: DISCONTINUED | OUTPATIENT
Start: 2018-10-10 | End: 2018-10-10

## 2018-10-10 RX ADMIN — LIDOCAINE 1 PATCH: 4 CREAM TOPICAL at 14:46

## 2018-10-10 RX ADMIN — Medication 15 MILLIGRAM(S): at 00:05

## 2018-10-10 RX ADMIN — SODIUM CHLORIDE 500 MILLILITER(S): 9 INJECTION, SOLUTION INTRAVENOUS at 14:50

## 2018-10-10 RX ADMIN — LIDOCAINE 1 PATCH: 4 CREAM TOPICAL at 14:47

## 2018-10-10 RX ADMIN — ONDANSETRON 4 MILLIGRAM(S): 8 TABLET, FILM COATED ORAL at 15:17

## 2018-10-10 RX ADMIN — Medication 100 MILLIGRAM(S): at 17:42

## 2018-10-10 RX ADMIN — Medication 100 MILLIGRAM(S): at 00:01

## 2018-10-10 RX ADMIN — SODIUM CHLORIDE 50 MILLILITER(S): 9 INJECTION, SOLUTION INTRAVENOUS at 06:58

## 2018-10-10 RX ADMIN — Medication 100 MILLIGRAM(S): at 06:58

## 2018-10-10 RX ADMIN — Medication 15 MILLIGRAM(S): at 00:29

## 2018-10-10 RX ADMIN — Medication 1000 MILLIGRAM(S): at 05:28

## 2018-10-10 RX ADMIN — HEPARIN SODIUM 5000 UNIT(S): 5000 INJECTION INTRAVENOUS; SUBCUTANEOUS at 17:42

## 2018-10-10 RX ADMIN — Medication 400 MILLIGRAM(S): at 04:22

## 2018-10-10 NOTE — DIETITIAN INITIAL EVALUATION ADULT. - ENERGY NEEDS
Ht 160.02cm; Wt 54.8Kg  IBW 52.3Kg; %%  BMI 21.4    Utilized ABW to calculate needs, pt falls within % of IBW. Adjusted for hypermetabolic state/post-op.

## 2018-10-10 NOTE — DIETITIAN INITIAL EVALUATION ADULT. - OTHER INFO
82y/o F with adenocarcinoma of the left upper lobe lung nodule and atypical cells of the contralateral paratracheal lymph node. Now s/p Mediastinoscopy, Lymph Node dissection, Left VATs, Robotic Assisted Left Upper Lobectomy, Bronchioplasty. Pt seen in chair with daughter at bedside. Pt is Farsi speaking and daughter requesting to translate. Per daughter pt is not feeling well and not appropriate for assessment today. Daughter states that pt was given narcotics and does not do well with them. Pt appears to be uncomfortable; RN aware. Daughter reports pt has not eaten anything yet post-op. Denies N/V/D and mechanical issues. PTA pt with reported weight loss which daughter attributes to stress of surgery, but unable to quantify. Attempted to further discuss pt with daughter, but daughter refusing stating "my mother isn't well". RD to follow per policy at high risk level.

## 2018-10-10 NOTE — DIETITIAN INITIAL EVALUATION ADULT. - PT NOT SOURCE
Isacsi speaking pt; drowsy and not feeling well upon assessment. Daughter reports that pt doesn't do well with narcotics which is why she is "off"

## 2018-10-10 NOTE — PHYSICAL THERAPY INITIAL EVALUATION ADULT - GENERAL OBSERVATIONS, REHAB EVAL
patient received seated out of bed with c/o significant dizziness after taking pain medication, RN Altaf present, patient was assisted back to bed from the chair. +EKG, +valorie, +chest tube to wall suction, +NC 2L/min, +bailey, +IV

## 2018-10-10 NOTE — PHYSICAL THERAPY INITIAL EVALUATION ADULT - PERTINENT HX OF CURRENT PROBLEM, REHAB EVAL
83 year old female presented for elective Left VATS, robotic assisted, left upper lobectomy, intraoperative frozen section with mediastinal lymph node dissection.

## 2018-10-10 NOTE — PHYSICAL THERAPY INITIAL EVALUATION ADULT - DID THE PATIENT HAVE SURGERY?
yes/Lobectomy of left lung, Mediastinoscopy, Left VATs, Robotic Assisted, Left Upper Lobectomy, Mediastinal Lymph Node Dissection, Bronchoplasty

## 2018-10-10 NOTE — PROGRESS NOTE ADULT - SUBJECTIVE AND OBJECTIVE BOX
9E to 9L transfer/acceptance note:     Operation / Date: 10/9/18 L VATS LULx, MDLN, bronchoplasty, mediastinoscopy     SUBJECTIVE ASSESSMENT:  Patient seen this morning at bedside, complaining of dizziness since this morning. Denies any nausea, headache,         Vital Signs Last 24 Hrs  T(C): 36.6 (10 Oct 2018 13:43), Max: 36.8 (10 Oct 2018 05:01)  T(F): 97.9 (10 Oct 2018 13:43), Max: 98.3 (10 Oct 2018 05:01)  HR: 94 (10 Oct 2018 14:00) (84 - 102)  BP: 119/67 (10 Oct 2018 14:00) (119/67 - 156/70)  BP(mean): 79 (10 Oct 2018 14:00) (79 - 118)  RR: 16 (10 Oct 2018 14:00) (11 - 20)  SpO2: 94% (10 Oct 2018 14:00) (93% - 99%)  I&O's Detail    09 Oct 2018 07:01  -  10 Oct 2018 07:00  --------------------------------------------------------  IN:    IV PiggyBack: 100 mL    lactated ringers.: 260 mL  Total IN: 360 mL    OUT:    Chest Tube: 80 mL    Indwelling Catheter - Urethral: 565 mL  Total OUT: 645 mL    Total NET: -285 mL      10 Oct 2018 07:01  -  10 Oct 2018 15:03  --------------------------------------------------------  IN:    lactated ringers.: 700 mL  Total IN: 700 mL    OUT:    Chest Tube: 20 mL    Indwelling Catheter - Urethral: 275 mL  Total OUT: 295 mL    Total NET: 405 mL          CHEST TUBE:  Yes/No. AIR LEAKS: Yes/No. Suction / H2O SEAL.   FABIANO DRAIN:  Yes/No.  EPICARDIAL WIRES: Yes/No.  TIE DOWNS: Yes/No.  THORNTON: Yes/No.    PHYSICAL EXAM:    General:     Neurological:    Cardiovascular:    Respiratory:    Gastrointestinal:    Extremities:    Vascular:    Incision Sites:    LABS:                        9.6    9.4   )-----------( 251      ( 10 Oct 2018 03:35 )             28.9       COUMADIN:  Yes/No. REASON: .    PT/INR - ( 10 Oct 2018 03:35 )   PT: 12.4 sec;   INR: 1.11          PTT - ( 10 Oct 2018 03:35 )  PTT:27.1 sec    10-10    135  |  99  |  22  ----------------------------<  201<H>  4.4   |  21<L>  |  1.10    Ca    8.6      10 Oct 2018 03:36  Phos  4.4     10-10  Mg     2.8     10-10    TPro  5.7<L>  /  Alb  3.8  /  TBili  0.2  /  DBili  x   /  AST  16  /  ALT  16  /  AlkPhos  56  10-10          MEDICATIONS  (STANDING):  docusate sodium 100 milliGRAM(s) Oral three times a day  heparin  Injectable 5000 Unit(s) SubCutaneous every 8 hours  lidocaine   Patch 1 Patch Transdermal daily  pantoprazole    Tablet 40 milliGRAM(s) Oral before breakfast    MEDICATIONS  (PRN):  ALBUTerol    0.083% 2.5 milliGRAM(s) Nebulizer every 6 hours PRN Shortness of Breath and/or Wheezing  ibuprofen  Tablet. 400 milliGRAM(s) Oral every 4 hours PRN Mild Pain (1 - 3)  ipratropium    for Nebulization 500 MICROGram(s) Nebulizer every 6 hours PRN Shortness of Breath and/or Wheezing  senna 2 Tablet(s) Oral at bedtime PRN Constipation        RADIOLOGY & ADDITIONAL TESTS: 9E to 9L transfer/acceptance note:     Operation / Date: 10/9/18 L VATS LULx, MDLN, bronchoplasty, mediastinoscopy     SUBJECTIVE ASSESSMENT:  Patient seen this morning at bedside, complaining of dizziness since this morning. Denies any nausea, headache, or palpitations.     Vital Signs Last 24 Hrs  T(C): 36.6 (10 Oct 2018 13:43), Max: 36.8 (10 Oct 2018 05:01)  T(F): 97.9 (10 Oct 2018 13:43), Max: 98.3 (10 Oct 2018 05:01)  HR: 94 (10 Oct 2018 14:00) (84 - 102)  BP: 119/67 (10 Oct 2018 14:00) (119/67 - 156/70)  BP(mean): 79 (10 Oct 2018 14:00) (79 - 118)  RR: 16 (10 Oct 2018 14:00) (11 - 20)  SpO2: 94% (10 Oct 2018 14:00) (93% - 99%)  I&O's Detail    09 Oct 2018 07:01  -  10 Oct 2018 07:00  --------------------------------------------------------  IN:    IV PiggyBack: 100 mL    lactated ringers.: 260 mL  Total IN: 360 mL    OUT:    Chest Tube: 80 mL    Indwelling Catheter - Urethral: 565 mL  Total OUT: 645 mL    Total NET: -285 mL      10 Oct 2018 07:01  -  10 Oct 2018 15:03  --------------------------------------------------------  IN:    lactated ringers.: 700 mL  Total IN: 700 mL    OUT:    Chest Tube: 20 mL    Indwelling Catheter - Urethral: 275 mL  Total OUT: 295 mL    Total NET: 405 mL    CHEST TUBE:  Yes, on suction no air leak   FABIANO DRAIN: No   EPICARDIAL WIRES: No   TIE DOWNS: Yes  THORNTON:  No     PHYSICAL EXAM:    General: Patient lying comfortably in bed, no acute distress     Neurological: Alert and oriented. No focal neurological deficits     Cardiovascular: S1S2, RRR, no murmurs appreciated on exam     Respiratory: Clear to ausculation bilaterally     Gastrointestinal: Abdomen soft, non tender, non distended     Extremities: Warm and well perfused. No edema or calf tenderness     Vascular: Peripheral pulses 2+ bilaterally     Incision Sites: L VATS incisions C/D/I, no drainage or surrounding erythema   Chest tube secured in place     LABS:                        9.6    9.4   )-----------( 251      ( 10 Oct 2018 03:35 )             28.9       COUMADIN: No    PT/INR - ( 10 Oct 2018 03:35 )   PT: 12.4 sec;   INR: 1.11          PTT - ( 10 Oct 2018 03:35 )  PTT:27.1 sec    10-10    135  |  99  |  22  ----------------------------<  201<H>  4.4   |  21<L>  |  1.10    Ca    8.6      10 Oct 2018 03:36  Phos  4.4     10-10  Mg     2.8     10-10    TPro  5.7<L>  /  Alb  3.8  /  TBili  0.2  /  DBili  x   /  AST  16  /  ALT  16  /  AlkPhos  56  10-10    MEDICATIONS  (STANDING):  docusate sodium 100 milliGRAM(s) Oral three times a day  heparin  Injectable 5000 Unit(s) SubCutaneous every 8 hours  lidocaine   Patch 1 Patch Transdermal daily  pantoprazole    Tablet 40 milliGRAM(s) Oral before breakfast    MEDICATIONS  (PRN):  ALBUTerol    0.083% 2.5 milliGRAM(s) Nebulizer every 6 hours PRN Shortness of Breath and/or Wheezing  ibuprofen  Tablet. 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 Patient was seen by Dr. Hathaway and deemed a good surgical candidate, and [facility] for elective surgery. Patient had mediastinoscopy, L VATS RA LULx with MLND. Procedure uncomplicated and patient transferred to ICU post procedure, remained hemodynamically stable and transferred to 9L POD#1.     Neurovascular: + Dizziness   - Patient complaining of subjective dizziness following IV dilaudid this morning, IVF and zofran. Avoid narcotics   - Continue ibuprofen and tylenol PRN     Cardiovascular: Hemodynamically stable. HR controlled.  - no active issues     Respiratory: 02 Sat = 94% on 3L NC   - Continue to wean O2 to maintain saO2 > 93%   - Chest tube to be removed this afternoon, follow up CXR   - Encourage C+DB and Use of IS 10x / hr while awake.    GI: Stable.  - Continue PO diet   - Continue protonix 40mg for GI ppx   - Continue bowel regimen     Renal / : Cr 1.10, no active issues   - Monitor renal function.  - Monitor I/O's.    Endocrine: Hgba1c and TSH pending     Prophylaxis:  - DVT prophylaxis with 5000 SubQ Heparin q8h.  - SCD's    Disposition: transfer to

## 2018-10-11 ENCOUNTER — TRANSCRIPTION ENCOUNTER (OUTPATIENT)
Age: 83
End: 2018-10-11

## 2018-10-11 DIAGNOSIS — C34.92 MALIGNANT NEOPLASM OF UNSPECIFIED PART OF LEFT BRONCHUS OR LUNG: ICD-10-CM

## 2018-10-11 LAB
ANION GAP SERPL CALC-SCNC: 12 MMOL/L — SIGNIFICANT CHANGE UP (ref 5–17)
APTT BLD: 26.4 SEC — LOW (ref 27.5–37.4)
BUN SERPL-MCNC: 16 MG/DL — SIGNIFICANT CHANGE UP (ref 7–23)
CALCIUM SERPL-MCNC: 8.7 MG/DL — SIGNIFICANT CHANGE UP (ref 8.4–10.5)
CHLORIDE SERPL-SCNC: 102 MMOL/L — SIGNIFICANT CHANGE UP (ref 96–108)
CO2 SERPL-SCNC: 24 MMOL/L — SIGNIFICANT CHANGE UP (ref 22–31)
CREAT SERPL-MCNC: 0.97 MG/DL — SIGNIFICANT CHANGE UP (ref 0.5–1.3)
GLUCOSE SERPL-MCNC: 121 MG/DL — HIGH (ref 70–99)
HCT VFR BLD CALC: 31.9 % — LOW (ref 34.5–45)
HGB BLD-MCNC: 10.3 G/DL — LOW (ref 11.5–15.5)
INR BLD: 1.06 — SIGNIFICANT CHANGE UP (ref 0.88–1.16)
MAGNESIUM SERPL-MCNC: 2.2 MG/DL — SIGNIFICANT CHANGE UP (ref 1.6–2.6)
MCHC RBC-ENTMCNC: 30 PG — SIGNIFICANT CHANGE UP (ref 27–34)
MCHC RBC-ENTMCNC: 32.3 G/DL — SIGNIFICANT CHANGE UP (ref 32–36)
MCV RBC AUTO: 93 FL — SIGNIFICANT CHANGE UP (ref 80–100)
PLATELET # BLD AUTO: 250 K/UL — SIGNIFICANT CHANGE UP (ref 150–400)
POTASSIUM SERPL-MCNC: 4 MMOL/L — SIGNIFICANT CHANGE UP (ref 3.5–5.3)
POTASSIUM SERPL-SCNC: 4 MMOL/L — SIGNIFICANT CHANGE UP (ref 3.5–5.3)
PROTHROM AB SERPL-ACNC: 11.8 SEC — SIGNIFICANT CHANGE UP (ref 9.8–12.7)
RBC # BLD: 3.43 M/UL — LOW (ref 3.8–5.2)
RBC # FLD: 13.8 % — SIGNIFICANT CHANGE UP (ref 10.3–16.9)
SODIUM SERPL-SCNC: 138 MMOL/L — SIGNIFICANT CHANGE UP (ref 135–145)
WBC # BLD: 6.8 K/UL — SIGNIFICANT CHANGE UP (ref 3.8–10.5)
WBC # FLD AUTO: 6.8 K/UL — SIGNIFICANT CHANGE UP (ref 3.8–10.5)

## 2018-10-11 PROCEDURE — 71045 X-RAY EXAM CHEST 1 VIEW: CPT | Mod: 26

## 2018-10-11 RX ORDER — ACETAMINOPHEN 500 MG
1000 TABLET ORAL ONCE
Qty: 0 | Refills: 0 | Status: COMPLETED | OUTPATIENT
Start: 2018-10-11 | End: 2018-10-11

## 2018-10-11 RX ORDER — KETOROLAC TROMETHAMINE 30 MG/ML
15 SYRINGE (ML) INJECTION EVERY 8 HOURS
Qty: 0 | Refills: 0 | Status: DISCONTINUED | OUTPATIENT
Start: 2018-10-11 | End: 2018-10-12

## 2018-10-11 RX ORDER — SODIUM CHLORIDE 9 MG/ML
1000 INJECTION, SOLUTION INTRAVENOUS
Qty: 0 | Refills: 0 | Status: DISCONTINUED | OUTPATIENT
Start: 2018-10-11 | End: 2018-10-12

## 2018-10-11 RX ADMIN — LIDOCAINE 1 PATCH: 4 CREAM TOPICAL at 01:31

## 2018-10-11 RX ADMIN — LIDOCAINE 1 PATCH: 4 CREAM TOPICAL at 11:59

## 2018-10-11 RX ADMIN — Medication 100 MILLIGRAM(S): at 21:22

## 2018-10-11 RX ADMIN — Medication 650 MILLIGRAM(S): at 23:00

## 2018-10-11 RX ADMIN — Medication 100 MILLIGRAM(S): at 14:01

## 2018-10-11 RX ADMIN — HEPARIN SODIUM 5000 UNIT(S): 5000 INJECTION INTRAVENOUS; SUBCUTANEOUS at 14:01

## 2018-10-11 RX ADMIN — HEPARIN SODIUM 5000 UNIT(S): 5000 INJECTION INTRAVENOUS; SUBCUTANEOUS at 21:22

## 2018-10-11 RX ADMIN — Medication 650 MILLIGRAM(S): at 16:20

## 2018-10-11 RX ADMIN — Medication 650 MILLIGRAM(S): at 22:05

## 2018-10-11 RX ADMIN — Medication 650 MILLIGRAM(S): at 06:00

## 2018-10-11 RX ADMIN — Medication 650 MILLIGRAM(S): at 05:25

## 2018-10-11 RX ADMIN — Medication 650 MILLIGRAM(S): at 15:46

## 2018-10-11 NOTE — PROGRESS NOTE ADULT - SUBJECTIVE AND OBJECTIVE BOX
Patient discussed on morning rounds with Dr. Hathaway/Sawyer    Operation / Date: 10/09/2018  Mediastinoscopy, Left VATS, Robotic Assisted, Left Upper Lobectomy, Mediastinal Lymph Node Dissection, Bronchoplasty     SUBJECTIVE ASSESSMENT:  Pt speaks some English, but daughter present helping with translation.  Patient feels "weak overall, sleepy and has incisional pain right chest, worse with deep breathing".  She denies SOB, dizziness, fever, chills, N/V/D.  She did not walk or get OOB yesterday after surgery due to her pain and weakness.  Just got OOB to the chair ~8am this morning.  PO appetite is not good, she hasn't really eaten yet.  Ordered for Ensure.  Did not have an IS at her bedside, because "she was too weak to try it" before now.  No BM since admission, but eating very little.      Vital Signs Last 24 Hrs  T(C): 37.1 (11 Oct 2018 08:45), Max: 37.1 (10 Oct 2018 22:09)  T(F): 98.7 (11 Oct 2018 08:45), Max: 98.7 (10 Oct 2018 22:09)  HR: 98 (11 Oct 2018 08:56) (90 - 106)  BP: 110/66 (11 Oct 2018 08:56) (110/53 - 141/68)  BP(mean): 81 (11 Oct 2018 08:56) (79 - 103)  RR: 16 (11 Oct 2018 08:56) (11 - 18)  SpO2: 95% (11 Oct 2018 08:56) (92% - 99%)  I&O's Detail    10 Oct 2018 07:01  -  11 Oct 2018 07:00  --------------------------------------------------------  IN:    IV PiggyBack: 50 mL    lactated ringers.: 1400 mL    lactated ringers.: 400 mL    Oral Fluid: 340 mL  Total IN: 2190 mL    OUT:    Chest Tube: 20 mL    Indwelling Catheter - Urethral: 275 mL  Total OUT: 295 mL    Total NET: 1895 mL      11 Oct 2018 07:01  -  11 Oct 2018 11:36  --------------------------------------------------------  IN:    lactated ringers.: 100 mL  Total IN: 100 mL    OUT:    Voided: 275 mL  Total OUT: 275 mL    Total NET: -175 mL      CHEST TUBE:  No  THORNTON: No    PHYSICAL EXAM:    GEN: NAD, tired, but breathing comfortably.  Daughter present.    Psych:  Seems a little "down"  Neuro: A&Ox3.  No focal deficits.  Moving all extremities.   HEENT: No obvious abnormalities  CV: S1S2, regular, no murmurs appreciated.  No carotid bruits.  No JVD  Lungs: Clear B/L.  No wheezing, rales or rhonchi  ABD: Soft, non-tender, non-distended.  +Bowel sounds  EXT: Warm and well perfused.  No peripheral edema noted  Musculoskeletal: Moving all extremities with normal ROM, no joint swelling  PV: Pedal pulses palpable  Incision Sites: Mediastinoscopy and VATS incisions are dressed, dry and intact.  CT site dressing in place.      LABS:                        10.3   6.8   )-----------( 250      ( 11 Oct 2018 05:44 )             31.9       PT/INR - ( 11 Oct 2018 05:44 )   PT: 11.8 sec;   INR: 1.06          PTT - ( 11 Oct 2018 05:44 )  PTT:26.4 sec    10-11    138  |  102  |  16  ----------------------------<  121<H>  4.0   |  24  |  0.97    Ca    8.7      11 Oct 2018 05:44  Phos  4.4     10-10  Mg     2.2     10-11    TPro  5.7<L>  /  Alb  3.8  /  TBili  0.2  /  DBili  x   /  AST  16  /  ALT  16  /  AlkPhos  56  10-10      MEDICATIONS  (STANDING):  docusate sodium 100 milliGRAM(s) Oral three times a day  heparin  Injectable 5000 Unit(s) SubCutaneous every 8 hours  lactated ringers. 1000 milliLiter(s) (50 mL/Hr) IV Continuous <Continuous>  lidocaine   Patch 1 Patch Transdermal daily  pantoprazole    Tablet 40 milliGRAM(s) Oral before breakfast    MEDICATIONS  (PRN):  acetaminophen   Tablet .. 650 milliGRAM(s) Oral every 6 hours PRN Mild Pain (1 - 3)  acetaminophen  IVPB .. 1000 milliGRAM(s) IV Intermittent once PRN Severe Pain (7 - 10)  ALBUTerol    0.083% 2.5 milliGRAM(s) Nebulizer every 6 hours PRN Shortness of Breath and/or Wheezing  ipratropium    for Nebulization 500 MICROGram(s) Nebulizer every 6 hours PRN Shortness of Breath and/or Wheezing  ketorolac   Injectable 15 milliGRAM(s) IV Push every 8 hours PRN Moderate Pain (4 - 6)  senna 2 Tablet(s) Oral at bedtime PRN Constipation        RADIOLOGY & ADDITIONAL TESTS:      < from: Xray Chest 1 View-PORTABLE IMMEDIATE (10.10.18 @ 12:30) >    IMPRESSION:  New small left apical pneumothorax.  No other significant interval findings.     < end of copied text >    10/11/18 CXR stable today.  official reading pending.

## 2018-10-11 NOTE — DISCHARGE NOTE ADULT - CARE PLAN
Principal Discharge DX:	Lung nodule  Goal:	Recover from surgery  Assessment and plan of treatment:	-Please follow up with Dr. Hathaway on __________.  The office is located at [facility], [facility], 4th floor. Call us with any questions  #[phone].    -Walk daily as tolerated and use your incentive spirometer every hour.    -No driving or strenuous activity/exercise for 6 weeks, or until  cleared by your surgeon.    -Gently clean your incisions with anti-bacterial soap and water, pat  dry.  You may leave them open to air.    -Call your doctor if you have shortness of breath, chest pain not  relieved by pain medication, dizziness, fever >101.5, or increased  redness or drainage from incisions. Principal Discharge DX:	Lung nodule  Goal:	Recover from surgery  Assessment and plan of treatment:	-Please follow up with Dr. Hathaway on 10/25/18 at 12:45pm.  The office is located at [facility], [facility], 4th floor. Call us with any questions #[phone].    -Walk daily as tolerated and use your incentive spirometer every hour.    -No driving or strenuous activity/exercise for 6 weeks, or until  cleared by your surgeon.    -Gently clean your incisions with anti-bacterial soap and water, pat  dry.  You may leave them open to air.    -Call your doctor if you have shortness of breath, chest pain not  relieved by pain medication, dizziness, fever >101.5, or increased  redness or drainage from incisions.  Goal:	Additional Follow-up Appointments  Assessment and plan of treatment:	Please follow-up with Dr. Carlos Alberto Andrews on 10/29/18 at 3:00pm.  His office information is as follows:  Address: [address] [place] [address], [place] [address]  Phone: [phone]  Goal:	Post-operative Pain Management  Assessment and plan of treatment:	While inpatient, you did not tolerate the effects of narcotics and have not been given a home prescription for pain medicines containing narcotics.  Because you are so sensitive to pain medications, we have prescribed the following regimen:  1. Tylenol 325mg Tablets: Take 1-2 tablets by mouth every 6 hours as needed for pain.  You can only take up to 4000mg (4 grams) of tylenol in a 24 hour period.   2. Motrin (Ibuprofen): 600mg Tablet.  Take 1 tablet by mouth every 6 hours as needed for pain.    -You should alternate dosing of Tylenol and Motrin to maximize the effects and keep your pain better controlled.  An example of this is to take Tylenol at 6am then take Motrin 3 hours later at 9am in between your Tylenol doses.  3. Lidocaine Patches: Apply 1 patch to the painful area and keep on for 12 hours.  Remove after 12 hours and keep the area without a patch for 12 hours until application of next patch.   -A written prescription has been supplied for you to fill at your local pharmacy.

## 2018-10-11 NOTE — DISCHARGE NOTE ADULT - PLAN OF CARE
Recover from surgery -Please follow up with Dr. Hathaway on __________.  The office is located at [facility], [facility], 4th floor. Call us with any questions  #[phone].    -Walk daily as tolerated and use your incentive spirometer every hour.    -No driving or strenuous activity/exercise for 6 weeks, or until  cleared by your surgeon.    -Gently clean your incisions with anti-bacterial soap and water, pat  dry.  You may leave them open to air.    -Call your doctor if you have shortness of breath, chest pain not  relieved by pain medication, dizziness, fever >101.5, or increased  redness or drainage from incisions. -Please follow up with Dr. Hathaway on 10/25/18 at 12:45pm.  The office is located at [facility], [facility], 4th floor. Call us with any questions #[phone].    -Walk daily as tolerated and use your incentive spirometer every hour.    -No driving or strenuous activity/exercise for 6 weeks, or until  cleared by your surgeon.    -Gently clean your incisions with anti-bacterial soap and water, pat  dry.  You may leave them open to air.    -Call your doctor if you have shortness of breath, chest pain not  relieved by pain medication, dizziness, fever >101.5, or increased  redness or drainage from incisions. Additional Follow-up Appointments Please follow-up with Dr. Carlos Alberto Andrews on 10/29/18 at 3:00pm.  His office information is as follows:  Address: [address] [place] [address], [place] [id number]  Phone: [phone] Post-operative Pain Management While inpatient, you did not tolerate the effects of narcotics and have not been given a home prescription for pain medicines containing narcotics.  Because you are so sensitive to pain medications, we have prescribed the following regimen:  1. Tylenol 325mg Tablets: Take 1-2 tablets by mouth every 6 hours as needed for pain.  You can only take up to 4000mg (4 grams) of tylenol in a 24 hour period.   2. Motrin (Ibuprofen): 600mg Tablet.  Take 1 tablet by mouth every 6 hours as needed for pain.    -You should alternate dosing of Tylenol and Motrin to maximize the effects and keep your pain better controlled.  An example of this is to take Tylenol at 6am then take Motrin 3 hours later at 9am in between your Tylenol doses.  3. Lidocaine Patches: Apply 1 patch to the painful area and keep on for 12 hours.  Remove after 12 hours and keep the area without a patch for 12 hours until application of next patch.   -A written prescription has been supplied for you to fill at your local pharmacy.

## 2018-10-11 NOTE — PROGRESS NOTE ADULT - PROBLEM SELECTOR PLAN 1
Neuro: Pain and dizziness have improved since yesterday.  Continue w/ Tylenol/Toradol PRN.  NO NARCOTICS as she did not tolerate them.  Watch creatinine while on Toradol because of her age.  (On low-dose).    CV: HR 90's- continue to monitor.  BP stable.  No h/o heart disease.    Lungs: Breathing well on nasal cannula 4L, saturating 95%.  Will try to wean over the next 24 hours.  Left chest tube removed yesterday, CXR remains stable.  She was educated on how to use the IS, and how often to use it.  Also educated on the importance of ambulating.  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  F/U OR pathology as well.      GI: Encouraged PO intake, reinforced the importance of nutrition in the healing phase.  Ensure ordered.  On GI prophylaxis. Continue w/ IVF at 50cc/hr until she is taking in more fluids/food.  Stool softeners ordered.      : Voiding on her own, adequate UOP.  Creatinine/BUN stable.      Heme: H/H stable.  No acute issues.  On DVT prophylaxis, SC heparin.     ID: No issues, afebrile, WBC normal.      Dispo: Home ??tomorrow if ambulating and eating better.

## 2018-10-11 NOTE — DISCHARGE NOTE ADULT - CARE PROVIDER_API CALL
Brett Hathaway (MD), Surgery; Thoracic Surgery  [address]  4th Floor  [place], [place] [address]  Phone: [phone]  Fax: [phone]

## 2018-10-11 NOTE — DISCHARGE NOTE ADULT - MEDICATION SUMMARY - MEDICATIONS TO TAKE
I will START or STAY ON the medications listed below when I get home from the hospital:    acetaminophen 325 mg oral tablet  -- 2 tab(s) by mouth every 6 hours, As needed, Mild Pain (1 - 3) MDD:8  -- Indication: For Pain, alternate with motrin    ibuprofen 600 mg oral tablet  -- 1 tab(s) by mouth every 6 hours MDD:4  -- Do not take this drug if you are pregnant.  It is very important that you take or use this exactly as directed.  Do not skip doses or discontinue unless directed by your doctor.  May cause drowsiness or dizziness.  Obtain medical advice before taking any non-prescription drugs as some may affect the action of this medication.  Take with food or milk.    -- Indication: For Pain, alternate with Tylenol    Lidoderm 5% topical film  -- Apply on skin to affected area once a day   -- For external use only.  Remove old patch prior to applying a new patch.    -- Indication: For Incisional pain    docusate sodium 100 mg oral capsule  -- 1 cap(s) by mouth 3 times a day, As Needed -for constipation   -- Indication: For Stool softener    senna oral tablet  -- 2 tab(s) by mouth once a day (at bedtime), As Needed -Constipation - for constipation   -- Indication: For Stool softener    pantoprazole 40 mg oral delayed release tablet  -- 1 tab(s) by mouth once a day (before a meal)  -- Indication: For Ulcer prevention

## 2018-10-11 NOTE — DISCHARGE NOTE ADULT - HOSPITAL COURSE
82 y/o female with multiple falls at home, who was incidentally found to have a pulmonary nodule in VIKASH on CT scan as part of her work up.  PET scan revealed 2.2cm spiculated nodule in VIKASH as well as hypermetabolic hilar lymphadenopathy.  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  She was admitted and underwent left VATS robotic assisted left upper lobectomy with bronchoplasty.  She recovered well and was moved from PACU to telemetry floor.  Her CT was removed on POD #1 with out incident. 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 Patient was seen by Dr. Hathaway and deemed a surgical candidate.  She presented to [facility] on 10/9/18 for elective mediastinoscopy, LVATS robotic LULx and MLND.  She arrived to CTICU in stable condition, extubated after uncomplicated intraop course.  Transferred to floor care on POD 1 after uncomplicated ICU course, chest tube removed on POD 1 with stable f/u CXR.  The remainder of her hospital course remained uncomplicated in regard to hemodynamics.  Patient continued to work with physical therapy and was medically optimized for discharge to home on 10/16/18, POD 7.  Patient continues to endorse significant post-operative pain despite pain regimen while inpatient.  After discussion with Dr. Hathaway and in regard to patient's known narcotic intolerance, she was prescribed home pain regimen and will follow-up with Dr. Hathaway in one week.  Over 30 minutes was spent with the patient reviewing the discharge material including medications, follow up appointments, recovery, concerning symptoms, and how to contact their health care providers if they have questions.

## 2018-10-11 NOTE — PROGRESS NOTE ADULT - ASSESSMENT
This is an 83 year old female non-smoker, with PMHx of multiple mechanical falls, with recent workup for mechanical fall found to have VIKASH nodule with hilar node lymphadenopathy.  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  Patient was seen by Dr. Hathaway and deemed a good surgical candidate for VATS procedure.  She came in on 0/9/18 for elective surgery. and is s/p left VATS RA LULx with MLND, mediastinoscopy.  Procedure uncomplicated and patient transferred to ICU post procedure (no beds available on 9LA), remained hemodynamically stable and transferred to 9L POD#1.  Today she is POD#2, and required constant encouragement to ambulate and use her IS.  She and her daughter were educated on the importance of these things.  She received Percocet yesterday and she did not tolerate it, felt dizzy and unwell.  Avoiding narcotics moving forward.  CT removed yesterday afternoon, CXR stable.

## 2018-10-11 NOTE — DISCHARGE NOTE ADULT - PATIENT PORTAL LINK FT
You can access the [facility] Patient Portal, offered by [facility], by registering with the following website: [email or web address]
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
## 2018-10-12 PROBLEM — R91.1 SOLITARY PULMONARY NODULE: Chronic | Status: ACTIVE | Noted: 2018-10-09

## 2018-10-12 PROBLEM — R59.0 LOCALIZED ENLARGED LYMPH NODES: Chronic | Status: ACTIVE | Noted: 2018-10-09

## 2018-10-12 PROBLEM — E04.1 NONTOXIC SINGLE THYROID NODULE: Chronic | Status: ACTIVE | Noted: 2018-10-09

## 2018-10-12 PROBLEM — D44.0 NEOPLASM OF UNCERTAIN BEHAVIOR OF THYROID GLAND: Chronic | Status: ACTIVE | Noted: 2018-10-09

## 2018-10-12 LAB
ANION GAP SERPL CALC-SCNC: 10 MMOL/L — SIGNIFICANT CHANGE UP (ref 5–17)
BUN SERPL-MCNC: 19 MG/DL — SIGNIFICANT CHANGE UP (ref 7–23)
CALCIUM SERPL-MCNC: 9.3 MG/DL — SIGNIFICANT CHANGE UP (ref 8.4–10.5)
CHLORIDE SERPL-SCNC: 104 MMOL/L — SIGNIFICANT CHANGE UP (ref 96–108)
CO2 SERPL-SCNC: 28 MMOL/L — SIGNIFICANT CHANGE UP (ref 22–31)
CREAT SERPL-MCNC: 0.96 MG/DL — SIGNIFICANT CHANGE UP (ref 0.5–1.3)
GLUCOSE SERPL-MCNC: 138 MG/DL — HIGH (ref 70–99)
HCT VFR BLD CALC: 33.1 % — LOW (ref 34.5–45)
HGB BLD-MCNC: 10.8 G/DL — LOW (ref 11.5–15.5)
MAGNESIUM SERPL-MCNC: 2.1 MG/DL — SIGNIFICANT CHANGE UP (ref 1.6–2.6)
MCHC RBC-ENTMCNC: 30.5 PG — SIGNIFICANT CHANGE UP (ref 27–34)
MCHC RBC-ENTMCNC: 32.6 G/DL — SIGNIFICANT CHANGE UP (ref 32–36)
MCV RBC AUTO: 93.5 FL — SIGNIFICANT CHANGE UP (ref 80–100)
PLATELET # BLD AUTO: 252 K/UL — SIGNIFICANT CHANGE UP (ref 150–400)
POTASSIUM SERPL-MCNC: 4.8 MMOL/L — SIGNIFICANT CHANGE UP (ref 3.5–5.3)
POTASSIUM SERPL-SCNC: 4.8 MMOL/L — SIGNIFICANT CHANGE UP (ref 3.5–5.3)
RBC # BLD: 3.54 M/UL — LOW (ref 3.8–5.2)
RBC # FLD: 13.7 % — SIGNIFICANT CHANGE UP (ref 10.3–16.9)
SODIUM SERPL-SCNC: 142 MMOL/L — SIGNIFICANT CHANGE UP (ref 135–145)
WBC # BLD: 6.6 K/UL — SIGNIFICANT CHANGE UP (ref 3.8–10.5)
WBC # FLD AUTO: 6.6 K/UL — SIGNIFICANT CHANGE UP (ref 3.8–10.5)

## 2018-10-12 PROCEDURE — 71045 X-RAY EXAM CHEST 1 VIEW: CPT | Mod: 26

## 2018-10-12 PROCEDURE — 93970 EXTREMITY STUDY: CPT | Mod: 26

## 2018-10-12 RX ADMIN — LIDOCAINE 1 PATCH: 4 CREAM TOPICAL at 00:04

## 2018-10-12 RX ADMIN — Medication 650 MILLIGRAM(S): at 10:54

## 2018-10-12 RX ADMIN — HEPARIN SODIUM 5000 UNIT(S): 5000 INJECTION INTRAVENOUS; SUBCUTANEOUS at 16:00

## 2018-10-12 RX ADMIN — Medication 650 MILLIGRAM(S): at 12:00

## 2018-10-12 RX ADMIN — PANTOPRAZOLE SODIUM 40 MILLIGRAM(S): 20 TABLET, DELAYED RELEASE ORAL at 10:53

## 2018-10-12 RX ADMIN — Medication 100 MILLIGRAM(S): at 10:53

## 2018-10-12 RX ADMIN — LIDOCAINE 1 PATCH: 4 CREAM TOPICAL at 22:44

## 2018-10-12 RX ADMIN — LIDOCAINE 1 PATCH: 4 CREAM TOPICAL at 11:28

## 2018-10-12 NOTE — PROGRESS NOTE ADULT - SUBJECTIVE AND OBJECTIVE BOX
Patient discussed on morning rounds with Dr. Jacques     Operation / Date: 10/9/18 L ALMA Gallego, bronchoplasty, mediastinoscopy     SUBJECTIVE ASSESSMENT:  Patient seen this morning at bedside, states she has not been able to eat since Monday because she has no appetite. Patient wants to walk this morning but states "her body wont let her" She denies any chest pressure or shortness of breath.     Vital Signs Last 24 Hrs  T(C): 36.3 (12 Oct 2018 04:40), Max: 36.8 (12 Oct 2018 01:00)  T(F): 97.4 (12 Oct 2018 04:40), Max: 98.2 (12 Oct 2018 01:00)  HR: 110 (12 Oct 2018 09:00) (76 - 110)  BP: 137/71 (12 Oct 2018 09:00) (104/63 - 147/80)  BP(mean): 97 (12 Oct 2018 09:00) (72 - 111)  RR: 14 (12 Oct 2018 05:10) (14 - 16)  SpO2: 92% (12 Oct 2018 09:00) (89% - 99%)  I&O's Detail    11 Oct 2018 07:01  -  12 Oct 2018 07:00  --------------------------------------------------------  IN:    lactated ringers.: 100 mL  Total IN: 100 mL    OUT:    Voided: 275 mL  Total OUT: 275 mL    Total NET: -175 mL    CHEST TUBE: No  FABIANO DRAIN: No   EPICARDIAL WIRES: No   TIE DOWNS: Yes  THORNTON:  No     PHYSICAL EXAM:    General: Patient sitting comfortably in a chair, no acute distress     Neurological: Alert and oriented. No focal neurological deficits     Cardiovascular: S1S2, RRR, no murmurs appreciated on exam     Respiratory: Clear to ausculation bilaterally     Gastrointestinal: Abdomen soft, non tender, non distended     Extremities: Warm and well perfused. No edema or calf tenderness     Vascular: Peripheral pulses 2+ bilaterally     Incision Sites: L VATS incisions C/D/I, no drainage or surrounding erythema   Chest tube secured in place       LABS:                        10.8   6.6   )-----------( 252      ( 12 Oct 2018 05:41 )             33.1       COUMADIN:  Yes/No. REASON: .    PT/INR - ( 11 Oct 2018 05:44 )   PT: 11.8 sec;   INR: 1.06          PTT - ( 11 Oct 2018 05:44 )  PTT:26.4 sec    10-12    142  |  104  |  19  ----------------------------<  138<H>  4.8   |  28  |  0.96    Ca    9.3      12 Oct 2018 05:41  Mg     2.1     10-12            MEDICATIONS  (STANDING):  docusate sodium 100 milliGRAM(s) Oral three times a day  heparin  Injectable 5000 Unit(s) SubCutaneous every 8 hours  lactated ringers. 1000 milliLiter(s) (50 mL/Hr) IV Continuous <Continuous>  lidocaine   Patch 1 Patch Transdermal daily  pantoprazole    Tablet 40 milliGRAM(s) Oral before breakfast    MEDICATIONS  (PRN):  acetaminophen   Tablet .. 650 milliGRAM(s) Oral every 6 hours PRN Mild Pain (1 - 3)  ALBUTerol    0.083% 2.5 milliGRAM(s) Nebulizer every 6 hours PRN Shortness of Breath and/or Wheezing  ipratropium    for Nebulization 500 MICROGram(s) Nebulizer every 6 hours PRN Shortness of Breath and/or Wheezing  senna 2 Tablet(s) Oral at bedtime PRN Constipation        RADIOLOGY & ADDITIONAL TESTS: Patient discussed on morning rounds with Dr. Jacques     Operation / Date: 10/9/18 L ALMA Gallego, bronchoplasty, mediastinoscopy     SUBJECTIVE ASSESSMENT:  Patient seen this morning at bedside, states she has not been able to eat since Monday because she has no appetite. Patient wants to walk this morning but states "her body wont let her" She denies any chest pressure or shortness of breath.     Vital Signs Last 24 Hrs  T(C): 36.3 (12 Oct 2018 04:40), Max: 36.8 (12 Oct 2018 01:00)  T(F): 97.4 (12 Oct 2018 04:40), Max: 98.2 (12 Oct 2018 01:00)  HR: 110 (12 Oct 2018 09:00) (76 - 110)  BP: 137/71 (12 Oct 2018 09:00) (104/63 - 147/80)  BP(mean): 97 (12 Oct 2018 09:00) (72 - 111)  RR: 14 (12 Oct 2018 05:10) (14 - 16)  SpO2: 92% (12 Oct 2018 09:00) (89% - 99%)  I&O's Detail    11 Oct 2018 07:01  -  12 Oct 2018 07:00  --------------------------------------------------------  IN:    lactated ringers.: 100 mL  Total IN: 100 mL    OUT:    Voided: 275 mL  Total OUT: 275 mL    Total NET: -175 mL    CHEST TUBE: No  FABIANO DRAIN: No   EPICARDIAL WIRES: No   TIE DOWNS: Yes  THORNTON:  No     PHYSICAL EXAM:    General: Patient sitting comfortably in a chair, no acute distress     Neurological: Alert and oriented. No focal neurological deficits     Cardiovascular: S1S2, RRR, no murmurs appreciated on exam     Respiratory: Poor inspiratory effort, clear to ausculation     Gastrointestinal: Abdomen soft, non tender, non distended     Extremities: Warm and well perfused. No edema or calf tenderness     Vascular: Peripheral pulses 2+ bilaterally     Incision Sites: L VATS incisions C/D/I, no drainage or surrounding erythema   Chest tube site covered with occlusive dressing C/D/I       LABS:                        10.8   6.6   )-----------( 252      ( 12 Oct 2018 05:41 )             33.1       COUMADIN:  No    PT/INR - ( 11 Oct 2018 05:44 )   PT: 11.8 sec;   INR: 1.06          PTT - ( 11 Oct 2018 05:44 )  PTT:26.4 sec    10-12    142  |  104  |  19  ----------------------------<  138<H>  4.8   |  28  |  0.96    Ca    9.3      12 Oct 2018 05:41  Mg     2.1     10-12      MEDICATIONS  (STANDING):  docusate sodium 100 milliGRAM(s) Oral three times a day  heparin  Injectable 5000 Unit(s) SubCutaneous every 8 hours  lidocaine   Patch 1 Patch Transdermal daily  pantoprazole    Tablet 40 milliGRAM(s) Oral before breakfast    MEDICATIONS  (PRN):  acetaminophen   Tablet .. 650 milliGRAM(s) Oral every 6 hours PRN Mild Pain (1 - 3)  ALBUTerol    0.083% 2.5 milliGRAM(s) Nebulizer every 6 hours PRN Shortness of Breath and/or Wheezing  ipratropium    for Nebulization 500 MICROGram(s) Nebulizer every 6 hours PRN Shortness of Breath and/or Wheezing  senna 2 Tablet(s) Oral at bedtime PRN Constipation    RADIOLOGY & ADDITIONAL TESTS:    A/P: 83 year old female, PMHx multiple mechanical falls, with recent workup for mechanical fall found to have VIKASH nodule with hilar node lymphadenopathy. Patient was seen by Dr. Hathaway and deemed a good surgical candidate, and presented to [facility] on 10/9/18 for elective surgery. Patient had mediastinoscopy, L VATS RA LULx with MLND. Procedure uncomplicated and patient transferred to ICU post procedure, remained hemodynamically stable and transferred to 9L POD#1. No acute events overnight     Neurovascular: Stable.   - Avoid narcotics 2/2 dizziness, continue tylenol PRN     Cardiovascular: Hemodynamically stable. HR controlled.  - No active issues     Respiratory: 02 Sat = 92% on RA    - CXR stable, follow up CXR in AM   - Needs pulmonary pushing and encourage to ambulate/use incentive spirometer   - Encourage C+DB and Use of IS 10x / hr while awake.    GI: Stable.  - Continue PO diet, Encourage patient to take PO   - Continue protonix 40mg for GI ppx   - Continue bowel regimen     Renal / : Cr 0.96, no active issues   - Monitor renal function.  - Monitor I/O's.    Endocrine: Hgba1c 5.4, TSH 0.712   - No active issues      Prophylaxis:  - DVT prophylaxis with 5000 SubQ Heparin q8h.  - SCD's    Disposition: home when medically ready Patient discussed on morning rounds with Dr. Jacques     Operation / Date: 10/9/18 L ALMA Gallego, bronchoplasty, mediastinoscopy     SUBJECTIVE ASSESSMENT:  Patient seen this morning at bedside, states she has not been able to eat since Monday because she has no appetite. Patient wants to walk this morning but states "her body wont let her" She denies any chest pressure or shortness of breath. Patient also refusing SQH.     Vital Signs Last 24 Hrs  T(C): 36.3 (12 Oct 2018 04:40), Max: 36.8 (12 Oct 2018 01:00)  T(F): 97.4 (12 Oct 2018 04:40), Max: 98.2 (12 Oct 2018 01:00)  HR: 110 (12 Oct 2018 09:00) (76 - 110)  BP: 137/71 (12 Oct 2018 09:00) (104/63 - 147/80)  BP(mean): 97 (12 Oct 2018 09:00) (72 - 111)  RR: 14 (12 Oct 2018 05:10) (14 - 16)  SpO2: 92% (12 Oct 2018 09:00) (89% - 99%)  I&O's Detail    11 Oct 2018 07:01  -  12 Oct 2018 07:00  --------------------------------------------------------  IN:    lactated ringers.: 100 mL  Total IN: 100 mL    OUT:    Voided: 275 mL  Total OUT: 275 mL    Total NET: -175 mL    CHEST TUBE: No  FABIANO DRAIN: No   EPICARDIAL WIRES: No   TIE DOWNS: Yes  THORNTON:  No     PHYSICAL EXAM:    General: Patient sitting comfortably in a chair, no acute distress     Neurological: Alert and oriented. No focal neurological deficits     Cardiovascular: S1S2, RRR, no murmurs appreciated on exam     Respiratory: Poor inspiratory effort, clear to ausculation     Gastrointestinal: Abdomen soft, non tender, non distended     Extremities: Warm and well perfused. No edema or calf tenderness     Vascular: Peripheral pulses 2+ bilaterally     Incision Sites: L VATS incisions C/D/I, no drainage or surrounding erythema   Chest tube site covered with occlusive dressing C/D/I       LABS:                        10.8   6.6   )-----------( 252      ( 12 Oct 2018 05:41 )             33.1       COUMADIN:  No    PT/INR - ( 11 Oct 2018 05:44 )   PT: 11.8 sec;   INR: 1.06          PTT - ( 11 Oct 2018 05:44 )  PTT:26.4 sec    10-12    142  |  104  |  19  ----------------------------<  138<H>  4.8   |  28  |  0.96    Ca    9.3      12 Oct 2018 05:41  Mg     2.1     10-12      MEDICATIONS  (STANDING):  docusate sodium 100 milliGRAM(s) Oral three times a day  heparin  Injectable 5000 Unit(s) SubCutaneous every 8 hours  lidocaine   Patch 1 Patch Transdermal daily  pantoprazole    Tablet 40 milliGRAM(s) Oral before breakfast    MEDICATIONS  (PRN):  acetaminophen   Tablet .. 650 milliGRAM(s) Oral every 6 hours PRN Mild Pain (1 - 3)  ALBUTerol    0.083% 2.5 milliGRAM(s) Nebulizer every 6 hours PRN Shortness of Breath and/or Wheezing  ipratropium    for Nebulization 500 MICROGram(s) Nebulizer every 6 hours PRN Shortness of Breath and/or Wheezing  senna 2 Tablet(s) Oral at bedtime PRN Constipation    RADIOLOGY & ADDITIONAL TESTS:    A/P: 83 year old female, PMHx multiple mechanical falls, with recent workup for mechanical fall found to have VIKASH nodule with hilar node lymphadenopathy. Patient was seen by Dr. Hathaway and deemed a good surgical candidate, and presented to [facility] on 10/9/18 for elective surgery. Patient had mediastinoscopy, L VATS RA LULx with MLND. Procedure uncomplicated and patient transferred to ICU post procedure, remained hemodynamically stable and transferred to 9L POD#1. No acute events overnight     Neurovascular: Stable.   - Avoid narcotics 2/2 dizziness, continue tylenol PRN     Cardiovascular: Hemodynamically stable. HR controlled.  - No active issues     Respiratory: 02 Sat = 92% on RA    - CXR stable, follow up CXR in AM   - Needs pulmonary pushing and encourage to ambulate/use incentive spirometer   - Encourage C+DB and Use of IS 10x / hr while awake.    GI: Stable.  - Continue PO diet, Encourage patient to take PO   - Continue protonix 40mg for GI ppx   - Continue bowel regimen     Renal / : Cr 0.96, no active issues   - Monitor renal function.  - Monitor I/O's.    Endocrine: Hgba1c 5.4, TSH 0.712   - No active issues      Prophylaxis:  - DVT prophylaxis with 5000 SubQ Heparin q8h.  - SCD's    Disposition: home when medically ready

## 2018-10-13 LAB
ANION GAP SERPL CALC-SCNC: 12 MMOL/L — SIGNIFICANT CHANGE UP (ref 5–17)
BUN SERPL-MCNC: 20 MG/DL — SIGNIFICANT CHANGE UP (ref 7–23)
CALCIUM SERPL-MCNC: 9.2 MG/DL — SIGNIFICANT CHANGE UP (ref 8.4–10.5)
CHLORIDE SERPL-SCNC: 97 MMOL/L — SIGNIFICANT CHANGE UP (ref 96–108)
CO2 SERPL-SCNC: 27 MMOL/L — SIGNIFICANT CHANGE UP (ref 22–31)
CREAT SERPL-MCNC: 1.04 MG/DL — SIGNIFICANT CHANGE UP (ref 0.5–1.3)
GLUCOSE SERPL-MCNC: 168 MG/DL — HIGH (ref 70–99)
HCT VFR BLD CALC: 32.3 % — LOW (ref 34.5–45)
HGB BLD-MCNC: 10.6 G/DL — LOW (ref 11.5–15.5)
MAGNESIUM SERPL-MCNC: 2 MG/DL — SIGNIFICANT CHANGE UP (ref 1.6–2.6)
MCHC RBC-ENTMCNC: 30.5 PG — SIGNIFICANT CHANGE UP (ref 27–34)
MCHC RBC-ENTMCNC: 32.8 G/DL — SIGNIFICANT CHANGE UP (ref 32–36)
MCV RBC AUTO: 92.8 FL — SIGNIFICANT CHANGE UP (ref 80–100)
PLATELET # BLD AUTO: 266 K/UL — SIGNIFICANT CHANGE UP (ref 150–400)
POTASSIUM SERPL-MCNC: 4.4 MMOL/L — SIGNIFICANT CHANGE UP (ref 3.5–5.3)
POTASSIUM SERPL-SCNC: 4.4 MMOL/L — SIGNIFICANT CHANGE UP (ref 3.5–5.3)
RBC # BLD: 3.48 M/UL — LOW (ref 3.8–5.2)
RBC # FLD: 13.5 % — SIGNIFICANT CHANGE UP (ref 10.3–16.9)
SODIUM SERPL-SCNC: 136 MMOL/L — SIGNIFICANT CHANGE UP (ref 135–145)
WBC # BLD: 5.6 K/UL — SIGNIFICANT CHANGE UP (ref 3.8–10.5)
WBC # FLD AUTO: 5.6 K/UL — SIGNIFICANT CHANGE UP (ref 3.8–10.5)

## 2018-10-13 PROCEDURE — 71045 X-RAY EXAM CHEST 1 VIEW: CPT | Mod: 26

## 2018-10-13 RX ORDER — ACETAMINOPHEN 500 MG
1000 TABLET ORAL ONCE
Qty: 0 | Refills: 0 | Status: COMPLETED | OUTPATIENT
Start: 2018-10-13 | End: 2018-10-13

## 2018-10-13 RX ADMIN — HEPARIN SODIUM 5000 UNIT(S): 5000 INJECTION INTRAVENOUS; SUBCUTANEOUS at 14:34

## 2018-10-13 RX ADMIN — HEPARIN SODIUM 5000 UNIT(S): 5000 INJECTION INTRAVENOUS; SUBCUTANEOUS at 06:20

## 2018-10-13 RX ADMIN — Medication 650 MILLIGRAM(S): at 22:37

## 2018-10-13 RX ADMIN — Medication 100 MILLIGRAM(S): at 14:35

## 2018-10-13 RX ADMIN — LIDOCAINE 1 PATCH: 4 CREAM TOPICAL at 23:10

## 2018-10-13 RX ADMIN — HEPARIN SODIUM 5000 UNIT(S): 5000 INJECTION INTRAVENOUS; SUBCUTANEOUS at 22:23

## 2018-10-13 RX ADMIN — Medication 400 MILLIGRAM(S): at 10:30

## 2018-10-13 RX ADMIN — Medication 100 MILLIGRAM(S): at 22:23

## 2018-10-13 RX ADMIN — Medication 100 MILLIGRAM(S): at 06:19

## 2018-10-13 RX ADMIN — Medication 100 MILLIGRAM(S): at 04:42

## 2018-10-13 RX ADMIN — LIDOCAINE 1 PATCH: 4 CREAM TOPICAL at 11:03

## 2018-10-13 RX ADMIN — Medication 1000 MILLIGRAM(S): at 10:56

## 2018-10-13 RX ADMIN — PANTOPRAZOLE SODIUM 40 MILLIGRAM(S): 20 TABLET, DELAYED RELEASE ORAL at 06:20

## 2018-10-13 NOTE — PROGRESS NOTE ADULT - ASSESSMENT
83 year old F, PMHx multiple mechanical falls, with recent workup for mechanical fall found to have VIKASH nodule with hilar node lymphadenopathy. Patient was seen by Dr. Hathaway and deemed a good surgical candidate, and presented to [facility] on 10/9/18 for elective surgery. Patient had mediastinoscopy, L VATS RA LULx with MLND. Procedure uncomplicated and patient transferred to ICU post procedure, remained hemodynamically stable and transferred to 9L POD#1. No acute events overnight, today POD4.     Neurovascular: Stable.   - Avoid narcotics 2/2 dizziness, continue tylenol PRN   -Lidoderm patch    Cardiovascular: Hemodynamically stable.   -Tachycardic to low 100s- LE duplex negative DVT  - No active issues   -monitor HR/Bp/tele    Respiratory: 02 Sat = 94% on RA    - CXR stable, follow up CXR in AM   - Needs pulmonary pushing and encourage to ambulate/use incentive spirometer   - Encourage C+DB and Use of IS 10x / hr while awake.  - Patient told risks of not mobilizing/using IS post surgery    GI: Stable.  - Continue PO diet, Encourage patient to take PO  - Continue protonix 40mg for GI ppx   - Continue bowel regimen     Renal / : Cr 1.04, no active issues   - Monitor renal function.  - Monitor I/O's.    Endocrine: Hgba1c 5.4, TSH 0.712   - No active issues      Prophylaxis:  - DVT prophylaxis with 5000 SubQ Heparin q8h. -Patient refusing intermittently- advised patient on important of DVT post operatively  - SCD's    Disposition:   home when medically ready

## 2018-10-13 NOTE — PROGRESS NOTE ADULT - SUBJECTIVE AND OBJECTIVE BOX
Patient discussed on morning rounds with Dr. Donald    Operation / Date: 10/9/18 ALMA Barrientos, bronchoplasty, mediastinoscopy     SUBJECTIVE ASSESSMENT:  83y Female seen and examined. Patients daugther at bedside for interpretation. States patient feels very weak, but she ambulated 1x in hallway today, she is trying to use her IS, pulling 500cc, tolerating PO diet, but has no appetite, daughter reports that she is trying to get her mother to eat, she already gave her 1 ensure shake.  She also reports pain near incision sites relieved with IV Tylenol. Denies fever, chest pain, palpitations, SOB, abdominal pain, n/v.       Vital Signs Last 24 Hrs  T(C): 36.4 (13 Oct 2018 09:43), Max: 37.7 (13 Oct 2018 01:00)  T(F): 97.5 (13 Oct 2018 09:43), Max: 99.8 (13 Oct 2018 01:00)  HR: 112 (13 Oct 2018 09:00) (90 - 114)  BP: 160/80 (13 Oct 2018 09:00) (137/61 - 160/80)  BP(mean): 121 (13 Oct 2018 09:00) (88 - 121)  RR: 20 (13 Oct 2018 09:00) (18 - 20)  SpO2: 94% (13 Oct 2018 09:00) (94% - 97%)  I&O's Detail    12 Oct 2018 07:01  -  13 Oct 2018 07:00  --------------------------------------------------------  IN:    Oral Fluid: 480 mL  Total IN: 480 mL    OUT:    Voided: 900 mL  Total OUT: 900 mL    Total NET: -420 mL      13 Oct 2018 07:01  -  13 Oct 2018 11:23  --------------------------------------------------------  IN:  Total IN: 0 mL    OUT:    Voided: 225 mL  Total OUT: 225 mL    Total NET: -225 mL      CHEST TUBE: No  FABIANO DRAIN: No   EPICARDIAL WIRES: No   TIE DOWNS: Yes  THORNTON:  No     PHYSICAL EXAM:    General: Patient lying comfortably in bed, no acute distress     Neurological: Alert and oriented. No focal neurological deficits     Cardiovascular: S1S2, RRR, no murmurs appreciated on exam     Respiratory: Clear to ausculation bilaterally, no wheeze/rhonchi/rales    Gastrointestinal: + BS, soft, non tender, non distended     Extremities: Warm and well perfused. No edema, no calf tenderness     Vascular: 2+ Peripheral pulses b/l     Incision Sites: L VATS/CT site: CDI.     LABS:                        10.6   5.6   )-----------( 266      ( 13 Oct 2018 05:35 )             32.3       COUMADIN:  NO        10-13    136  |  97  |  20  ----------------------------<  168<H>  4.4   |  27  |  1.04    Ca    9.2      13 Oct 2018 05:35  Mg     2.0     10-13            MEDICATIONS  (STANDING):  docusate sodium 100 milliGRAM(s) Oral three times a day  heparin  Injectable 5000 Unit(s) SubCutaneous every 8 hours  lidocaine   Patch 1 Patch Transdermal daily  pantoprazole    Tablet 40 milliGRAM(s) Oral before breakfast    MEDICATIONS  (PRN):  acetaminophen   Tablet .. 650 milliGRAM(s) Oral every 6 hours PRN Mild Pain (1 - 3)  ALBUTerol    0.083% 2.5 milliGRAM(s) Nebulizer every 6 hours PRN Shortness of Breath and/or Wheezing  ipratropium    for Nebulization 500 MICROGram(s) Nebulizer every 6 hours PRN Shortness of Breath and/or Wheezing  senna 2 Tablet(s) Oral at bedtime PRN Constipation        RADIOLOGY & ADDITIONAL TESTS:

## 2018-10-13 NOTE — PROVIDER CONTACT NOTE (OTHER) - ACTION/TREATMENT ORDERED:
Tylenol 1 gm IV ordered. pt and family then refused, pt states "I prefer to take the pills, the IV is too strong"  Pain level and medication teaching reinforced

## 2018-10-14 LAB
ANION GAP SERPL CALC-SCNC: 10 MMOL/L — SIGNIFICANT CHANGE UP (ref 5–17)
BUN SERPL-MCNC: 28 MG/DL — HIGH (ref 7–23)
CALCIUM SERPL-MCNC: 10 MG/DL — SIGNIFICANT CHANGE UP (ref 8.4–10.5)
CHLORIDE SERPL-SCNC: 99 MMOL/L — SIGNIFICANT CHANGE UP (ref 96–108)
CO2 SERPL-SCNC: 30 MMOL/L — SIGNIFICANT CHANGE UP (ref 22–31)
CREAT SERPL-MCNC: 0.97 MG/DL — SIGNIFICANT CHANGE UP (ref 0.5–1.3)
GLUCOSE SERPL-MCNC: 163 MG/DL — HIGH (ref 70–99)
HCT VFR BLD CALC: 34.1 % — LOW (ref 34.5–45)
HGB BLD-MCNC: 10.9 G/DL — LOW (ref 11.5–15.5)
MAGNESIUM SERPL-MCNC: 2 MG/DL — SIGNIFICANT CHANGE UP (ref 1.6–2.6)
MCHC RBC-ENTMCNC: 29.6 PG — SIGNIFICANT CHANGE UP (ref 27–34)
MCHC RBC-ENTMCNC: 32 G/DL — SIGNIFICANT CHANGE UP (ref 32–36)
MCV RBC AUTO: 92.7 FL — SIGNIFICANT CHANGE UP (ref 80–100)
PHOSPHATE SERPL-MCNC: 3 MG/DL — SIGNIFICANT CHANGE UP (ref 2.5–4.5)
PLATELET # BLD AUTO: 312 K/UL — SIGNIFICANT CHANGE UP (ref 150–400)
POTASSIUM SERPL-MCNC: 4.9 MMOL/L — SIGNIFICANT CHANGE UP (ref 3.5–5.3)
POTASSIUM SERPL-SCNC: 4.9 MMOL/L — SIGNIFICANT CHANGE UP (ref 3.5–5.3)
RBC # BLD: 3.68 M/UL — LOW (ref 3.8–5.2)
RBC # FLD: 13.6 % — SIGNIFICANT CHANGE UP (ref 10.3–16.9)
SODIUM SERPL-SCNC: 139 MMOL/L — SIGNIFICANT CHANGE UP (ref 135–145)
WBC # BLD: 5.6 K/UL — SIGNIFICANT CHANGE UP (ref 3.8–10.5)
WBC # FLD AUTO: 5.6 K/UL — SIGNIFICANT CHANGE UP (ref 3.8–10.5)

## 2018-10-14 PROCEDURE — 71045 X-RAY EXAM CHEST 1 VIEW: CPT | Mod: 26

## 2018-10-14 RX ORDER — MAGNESIUM HYDROXIDE 400 MG/1
30 TABLET, CHEWABLE ORAL DAILY
Qty: 0 | Refills: 0 | Status: DISCONTINUED | OUTPATIENT
Start: 2018-10-14 | End: 2018-10-16

## 2018-10-14 RX ORDER — ACETAMINOPHEN 500 MG
650 TABLET ORAL ONCE
Qty: 0 | Refills: 0 | Status: COMPLETED | OUTPATIENT
Start: 2018-10-14 | End: 2018-10-15

## 2018-10-14 RX ADMIN — HEPARIN SODIUM 5000 UNIT(S): 5000 INJECTION INTRAVENOUS; SUBCUTANEOUS at 21:14

## 2018-10-14 RX ADMIN — LIDOCAINE 1 PATCH: 4 CREAM TOPICAL at 10:14

## 2018-10-14 RX ADMIN — LIDOCAINE 1 PATCH: 4 CREAM TOPICAL at 23:05

## 2018-10-14 RX ADMIN — Medication 650 MILLIGRAM(S): at 11:00

## 2018-10-14 RX ADMIN — Medication 650 MILLIGRAM(S): at 09:56

## 2018-10-14 RX ADMIN — Medication 650 MILLIGRAM(S): at 00:17

## 2018-10-14 RX ADMIN — MAGNESIUM HYDROXIDE 30 MILLILITER(S): 400 TABLET, CHEWABLE ORAL at 10:14

## 2018-10-14 RX ADMIN — Medication 100 MILLIGRAM(S): at 06:42

## 2018-10-14 RX ADMIN — HEPARIN SODIUM 5000 UNIT(S): 5000 INJECTION INTRAVENOUS; SUBCUTANEOUS at 12:56

## 2018-10-14 RX ADMIN — HEPARIN SODIUM 5000 UNIT(S): 5000 INJECTION INTRAVENOUS; SUBCUTANEOUS at 06:42

## 2018-10-14 RX ADMIN — PANTOPRAZOLE SODIUM 40 MILLIGRAM(S): 20 TABLET, DELAYED RELEASE ORAL at 06:42

## 2018-10-14 NOTE — PROGRESS NOTE ADULT - ASSESSMENT
83 year old Female with history of multiple mechanical falls, with recent workup for mechanical fall found to have VIKASH nodule with hilar node lymphadenopathy. Patient was seen by Dr. Hathaway and deemed a surgical candidate.  She presented to [facility] on 10/9/18 for elective mediastinoscopy, LVATS robotic LULx and MLND.  She arrived to CTICU in stable condition, extubated after uncomplicated intraop course.  Transferred to floor care on POD 1 with uncomplicated ICU course    Neurovascular: Stable.   - Avoid narcotics 2/2 dizziness, continue tylenol PRN   -Lidoderm patch    Cardiovascular: Hemodynamically stable.   -Tachycardic to low 100s- LE duplex negative DVT  - No active issues   -monitor HR/Bp/tele    Respiratory: 02 Sat = 94% on RA    - CXR stable, follow up CXR in AM   - Needs pulmonary pushing and encourage to ambulate/use incentive spirometer   - Encourage C+DB and Use of IS 10x / hr while awake.  - Patient told risks of not mobilizing/using IS post surgery    GI: Stable.  - Continue PO diet, Encourage patient to take PO  - Continue protonix 40mg for GI ppx   - Continue bowel regimen     Renal / : Cr 1.04, no active issues   - Monitor renal function.  - Monitor I/O's.    Endocrine: Hgba1c 5.4, TSH 0.712   - No active issues      Prophylaxis:  - DVT prophylaxis with 5000 SubQ Heparin q8h. -Patient refusing intermittently- advised patient on important of DVT post operatively  - SCD's    Disposition:   home when medically ready 83 year old Female with history of multiple mechanical falls, with recent workup for mechanical fall found to have VIKASH nodule with hilar node lymphadenopathy. Patient was seen by Dr. Hathaway and deemed a surgical candidate.  She presented to [facility] on 10/9/18 for elective mediastinoscopy, LVATS robotic LULx and MLND.  She arrived to CTICU in stable condition, extubated after uncomplicated intraop course.  Transferred to floor care on POD 1 after uncomplicated ICU course. She has remained inpatient for medical optimization and physical therapy.  Now POD 5, no acute events overnight.       Neurovascular: Stable.   - Avoid narcotics 2/2 dizziness/sedation, continue tylenol PRN   -Lidoderm patch    Cardiovascular:   -HR controlled, HD stable.   -No active issues   -LE duplex negative for DVT  -monitor HR/Bp/tele    Respiratory: Saturates well on RA.  POD 5 s/p L VATS LULx, MDLN, bronchoplasty, mediastinoscopy   - CXR stable, repeat in AM  - Pulls 500cc on IS, continue to encourage  - Encourage frequent ambulation  - Encourage C+DB and Use of IS 10x / hr while awake.    GI: Stable.  - Continue PO diet, Encourage patient to take PO  - Continue protonix 40mg for GI ppx   - Continue bowel regimen     Renal / : BUN/Cr 28/0.97, no active issues   - Monitor renal function.  - Monitor I/O's.    Endocrine: A1C: 5.4, TSH 0.712   - No active issues      Prophylaxis:  - DVT prophylaxis with 5000 SubQ Heparin q8h.   - SCD's    Disposition:   Home tomorrow

## 2018-10-14 NOTE — PROGRESS NOTE ADULT - SUBJECTIVE AND OBJECTIVE BOX
Patient discussed on morning rounds with       Operation / Date:     SUBJECTIVE ASSESSMENT:  83y Female     Vital Signs Last 24 Hrs  T(C): 36.6 (14 Oct 2018 10:05), Max: 37.3 (13 Oct 2018 17:36)  T(F): 97.8 (14 Oct 2018 10:05), Max: 99.2 (13 Oct 2018 17:36)  HR: 100 (14 Oct 2018 12:52) (94 - 110)  BP: 109/58 (14 Oct 2018 12:52) (109/58 - 144/70)  BP(mean): 69 (14 Oct 2018 12:52) (69 - 104)  RR: 18 (14 Oct 2018 12:52) (16 - 20)  SpO2: 95% (14 Oct 2018 12:52) (94% - 100%)  I&O's Detail    13 Oct 2018 07:01  -  14 Oct 2018 07:00  --------------------------------------------------------  IN:  Total IN: 0 mL    OUT:    Voided: 375 mL  Total OUT: 375 mL    Total NET: -375 mL          CHEST TUBE:  Yes/No. AIR LEAKS: Yes/No. Suction / H2O SEAL.   FABIANO DRAIN:  Yes/No.  EPICARDIAL WIRES: Yes/No.  TIE DOWNS: Yes/No.  THORNTON: Yes/No.    PHYSICAL EXAM:  General:   Neurological:  Cardiovascular:  Respiratory:  Gastrointestinal:  Extremities:  Vascular:  Incision Sites:    LABS:                        10.9   5.6   )-----------( 312      ( 14 Oct 2018 06:03 )             34.1       COUMADIN:  Yes/No. REASON: .        10-14    139  |  99  |  28<H>  ----------------------------<  163<H>  4.9   |  30  |  0.97    Ca    10.0      14 Oct 2018 06:02  Phos  3.0     10-14  Mg     2.0     10-14      MEDICATIONS  (STANDING):  acetaminophen  IVPB .. 650 milliGRAM(s) IV Intermittent once  docusate sodium 100 milliGRAM(s) Oral three times a day  heparin  Injectable 5000 Unit(s) SubCutaneous every 8 hours  lidocaine   Patch 1 Patch Transdermal daily  pantoprazole    Tablet 40 milliGRAM(s) Oral before breakfast    MEDICATIONS  (PRN):  acetaminophen   Tablet .. 650 milliGRAM(s) Oral every 6 hours PRN Mild Pain (1 - 3)  ALBUTerol    0.083% 2.5 milliGRAM(s) Nebulizer every 6 hours PRN Shortness of Breath and/or Wheezing  ipratropium    for Nebulization 500 MICROGram(s) Nebulizer every 6 hours PRN Shortness of Breath and/or Wheezing  magnesium hydroxide Suspension 30 milliLiter(s) Oral daily PRN Constipation  senna 2 Tablet(s) Oral at bedtime PRN Constipation      RADIOLOGY & ADDITIONAL TESTS: Patient discussed on morning rounds with Dr. Roland      Operation / Date: 10/9/18 L VATS LULx, MLND bronchoplasty, mediastinoscopy     SUBJECTIVE ASSESSMENT:  83y Female seen at bedside this AM.  She continues to endorse incisional pain that is relieved with IV tylenol.  Otherwise, she feels that her strength in improved much since yesterday and she is ambulating more with less difficulty today.  Denies HA, AMS, CP, palpitations, SOB, cough, hemoptysis, n/v/d, fever.     Vital Signs Last 24 Hrs  T(C): 36.6 (14 Oct 2018 10:05), Max: 37.3 (13 Oct 2018 17:36)  T(F): 97.8 (14 Oct 2018 10:05), Max: 99.2 (13 Oct 2018 17:36)  HR: 100 (14 Oct 2018 12:52) (94 - 110)  BP: 109/58 (14 Oct 2018 12:52) (109/58 - 144/70)  BP(mean): 69 (14 Oct 2018 12:52) (69 - 104)  RR: 18 (14 Oct 2018 12:52) (16 - 20)  SpO2: 95% (14 Oct 2018 12:52) (94% - 100%)  I&O's Detail    13 Oct 2018 07:01  -  14 Oct 2018 07:00  --------------------------------------------------------  IN:  Total IN: 0 mL    OUT:    Voided: 375 mL  Total OUT: 375 mL    Total NET: -375 mL    CHEST TUBE:  No.   FABIANO DRAIN:  No.  EPICARDIAL WIRES: No.  TIE DOWNS: Yes  THORNTON: No.     PHYSICAL EXAM:  General: OOB to chair, no acute distress.   Neurological: AAOx3, no AMS or focal deficits.   Cardiovascular: RRR, S1/S2, no m/r/g.   Respiratory: No acute distress, CTA b/l, no w/r/r.   Gastrointestinal: ND, NBS, non-TTP.  Extremities: Warm and well perfused, no calf ttp b/l.  No edema b/l.   Vascular: Pulses 2+ throughout.   Incision sites: VATS: C/D/I    LABS:                        10.9   5.6   )-----------( 312      ( 14 Oct 2018 06:03 )             34.1       COUMADIN:  No.    10-14    139  |  99  |  28<H>  ----------------------------<  163<H>  4.9   |  30  |  0.97    Ca    10.0      14 Oct 2018 06:02  Phos  3.0     10-14  Mg     2.0     10-14      MEDICATIONS  (STANDING):  acetaminophen  IVPB .. 650 milliGRAM(s) IV Intermittent once  docusate sodium 100 milliGRAM(s) Oral three times a day  heparin  Injectable 5000 Unit(s) SubCutaneous every 8 hours  lidocaine   Patch 1 Patch Transdermal daily  pantoprazole    Tablet 40 milliGRAM(s) Oral before breakfast    MEDICATIONS  (PRN):  acetaminophen   Tablet .. 650 milliGRAM(s) Oral every 6 hours PRN Mild Pain (1 - 3)  ALBUTerol    0.083% 2.5 milliGRAM(s) Nebulizer every 6 hours PRN Shortness of Breath and/or Wheezing  ipratropium    for Nebulization 500 MICROGram(s) Nebulizer every 6 hours PRN Shortness of Breath and/or Wheezing  magnesium hydroxide Suspension 30 milliLiter(s) Oral daily PRN Constipation  senna 2 Tablet(s) Oral at bedtime PRN Constipation    RADIOLOGY & ADDITIONAL TESTS:  < from: Xray Chest 1 View- PORTABLE-Routine (10.13.18 @ 06:57) >  EXAM:  XR CHEST PORTABLE ROUTINE 1V                          PROCEDURE DATE:  10/13/2018          INTERPRETATION:  Clinical History: Postop    Portable exam at chest demonstrates cardiomegaly. Bilateral effusions.   Degenerative changes thoracic spine. Calcification aortic knob.    Impression: Small bilateral effusions      < end of copied text >

## 2018-10-15 LAB
ANION GAP SERPL CALC-SCNC: 10 MMOL/L — SIGNIFICANT CHANGE UP (ref 5–17)
BUN SERPL-MCNC: 37 MG/DL — HIGH (ref 7–23)
CALCIUM SERPL-MCNC: 9.9 MG/DL — SIGNIFICANT CHANGE UP (ref 8.4–10.5)
CHLORIDE SERPL-SCNC: 97 MMOL/L — SIGNIFICANT CHANGE UP (ref 96–108)
CO2 SERPL-SCNC: 29 MMOL/L — SIGNIFICANT CHANGE UP (ref 22–31)
CREAT SERPL-MCNC: 1.1 MG/DL — SIGNIFICANT CHANGE UP (ref 0.5–1.3)
GLUCOSE SERPL-MCNC: 162 MG/DL — HIGH (ref 70–99)
MAGNESIUM SERPL-MCNC: 2.2 MG/DL — SIGNIFICANT CHANGE UP (ref 1.6–2.6)
POTASSIUM SERPL-MCNC: 5 MMOL/L — SIGNIFICANT CHANGE UP (ref 3.5–5.3)
POTASSIUM SERPL-SCNC: 5 MMOL/L — SIGNIFICANT CHANGE UP (ref 3.5–5.3)
SODIUM SERPL-SCNC: 136 MMOL/L — SIGNIFICANT CHANGE UP (ref 135–145)
SURGICAL PATHOLOGY STUDY: SIGNIFICANT CHANGE UP

## 2018-10-15 PROCEDURE — 71045 X-RAY EXAM CHEST 1 VIEW: CPT | Mod: 26

## 2018-10-15 RX ORDER — SODIUM CHLORIDE 9 MG/ML
500 INJECTION INTRAMUSCULAR; INTRAVENOUS; SUBCUTANEOUS ONCE
Qty: 0 | Refills: 0 | Status: COMPLETED | OUTPATIENT
Start: 2018-10-15 | End: 2018-10-15

## 2018-10-15 RX ADMIN — Medication 650 MILLIGRAM(S): at 23:20

## 2018-10-15 RX ADMIN — Medication 650 MILLIGRAM(S): at 17:40

## 2018-10-15 RX ADMIN — HEPARIN SODIUM 5000 UNIT(S): 5000 INJECTION INTRAVENOUS; SUBCUTANEOUS at 21:11

## 2018-10-15 RX ADMIN — HEPARIN SODIUM 5000 UNIT(S): 5000 INJECTION INTRAVENOUS; SUBCUTANEOUS at 06:12

## 2018-10-15 RX ADMIN — HEPARIN SODIUM 5000 UNIT(S): 5000 INJECTION INTRAVENOUS; SUBCUTANEOUS at 12:47

## 2018-10-15 RX ADMIN — Medication 650 MILLIGRAM(S): at 10:22

## 2018-10-15 RX ADMIN — Medication 650 MILLIGRAM(S): at 16:13

## 2018-10-15 RX ADMIN — Medication 100 MILLIGRAM(S): at 21:11

## 2018-10-15 RX ADMIN — PANTOPRAZOLE SODIUM 40 MILLIGRAM(S): 20 TABLET, DELAYED RELEASE ORAL at 06:14

## 2018-10-15 RX ADMIN — Medication 650 MILLIGRAM(S): at 22:20

## 2018-10-15 RX ADMIN — LIDOCAINE 1 PATCH: 4 CREAM TOPICAL at 12:47

## 2018-10-15 RX ADMIN — SODIUM CHLORIDE 100 MILLILITER(S): 9 INJECTION INTRAMUSCULAR; INTRAVENOUS; SUBCUTANEOUS at 12:44

## 2018-10-15 RX ADMIN — Medication 260 MILLIGRAM(S): at 08:26

## 2018-10-15 NOTE — PROGRESS NOTE ADULT - SUBJECTIVE AND OBJECTIVE BOX
Patient discussed on morning rounds with Dr. Hathaway    Operation / Date:  10/9/18 L VATS LULx, MLND bronchoplasty, mediastinoscopy     SUBJECTIVE ASSESSMENT:  83y Female seen and examined bedside. Patient complaining of pain at the incision sites, relieved with tylenol. Denies chest pain, SOB, palpitations, hemopytsis, N/V/D, abdominal pain, dizziness, fever or chills. Patient is ambulating, tolerating PO diet, and voiding spontaneously.     Vital Signs Last 24 Hrs  T(C): 36.3 (15 Oct 2018 17:01), Max: 36.9 (15 Oct 2018 13:21)  T(F): 97.3 (15 Oct 2018 17:01), Max: 98.4 (15 Oct 2018 13:21)  HR: 94 (15 Oct 2018 16:51) (82 - 106)  BP: 113/67 (15 Oct 2018 16:51) (101/64 - 139/71)  BP(mean): 86 (15 Oct 2018 16:51) (82 - 89)  RR: 18 (15 Oct 2018 16:51) (11 - 20)  SpO2: 96% (15 Oct 2018 16:51) (95% - 97%)  I&O's Detail    14 Oct 2018 07:01  -  15 Oct 2018 07:00  --------------------------------------------------------  IN:  Total IN: 0 mL    OUT:    Voided: 120 mL  Total OUT: 120 mL    Total NET: -120 mL      15 Oct 2018 07:01  -  15 Oct 2018 20:02  --------------------------------------------------------  IN:    0.9% NaCl: 300 mL    IV PiggyBack: 100 mL    Oral Fluid: 1071 mL  Total IN: 1471 mL    OUT:    Voided: 800 mL  Total OUT: 800 mL    Total NET: 671 mL        CHEST TUBE:  No.   FABIANO DRAIN:  No.  EPICARDIAL WIRES: No.  TIE DOWNS: Yes  THORNTON: No.     PHYSICAL EXAM:    General: Patient lying comfortably in bed, no acute distress     Neurological: Alert and oriented. No focal neurological deficits     Cardiovascular: S1S2, RRR, no murmurs appreciated on exam     Respiratory: Clear to ausculation bilaterally, no wheezes rales or rhonchi    Gastrointestinal: Abdomen soft, non tender, non distended     Extremities: Warm and well perfused. No peripheral edema or calf tenderness     Vascular: Peripheral pulses palpable bilaterally.     Incision Sites: VATS: c/d/i, tiedown in place. To be removed prior to d/c.  LABS:                        10.9   5.6   )-----------( 312      ( 14 Oct 2018 06:03 )             34.1       COUMADIN:  No.      10-15    136  |  97  |  37<H>  ----------------------------<  162<H>  5.0   |  29  |  1.10    Ca    9.9      15 Oct 2018 06:03  Phos  3.0     10-14  Mg     2.2     10-15            MEDICATIONS  (STANDING):  docusate sodium 100 milliGRAM(s) Oral three times a day  heparin  Injectable 5000 Unit(s) SubCutaneous every 8 hours  lidocaine   Patch 1 Patch Transdermal daily  pantoprazole    Tablet 40 milliGRAM(s) Oral before breakfast    MEDICATIONS  (PRN):  acetaminophen   Tablet .. 650 milliGRAM(s) Oral every 6 hours PRN Mild Pain (1 - 3)  ALBUTerol    0.083% 2.5 milliGRAM(s) Nebulizer every 6 hours PRN Shortness of Breath and/or Wheezing  ipratropium    for Nebulization 500 MICROGram(s) Nebulizer every 6 hours PRN Shortness of Breath and/or Wheezing  magnesium hydroxide Suspension 30 milliLiter(s) Oral daily PRN Constipation  senna 2 Tablet(s) Oral at bedtime PRN Constipation        RADIOLOGY & ADDITIONAL TESTS:    < from: Xray Chest 1 View- PORTABLE-Routine (10.15.18 @ 06:06) >    EXAM:  XR CHEST PORTABLE ROUTINE 1V                          PROCEDURE DATE:  10/15/2018          INTERPRETATION:  AP Portable CXR dated 10/15/2018 6:06 AM    CLINICAL INFORMATION: post-op    PRIOR STUDIES: Chest radiograph dated 10/14/2018    FINDINGS: The cardiomediastinal silhouette is unchanged. Again left upper   lung zone postsurgical changes and evidence of left lung volume is noted.   Status post left upper lobe lobectomy. Small left apical pleural   effusion. Retrocardiac opacity suggesting atelectasis. Resolution of   right basilar atelectasis. No pleural effusion.    IMPRESSION:  Resolution of right basilar atelectasis.    < end of copied text >

## 2018-10-15 NOTE — CHART NOTE - NSCHARTNOTEFT_GEN_A_CORE
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 +BM    Pain: Chest pain endorsed    Skin Integrity: Surgical wound     Labs:   10-15    136  |  97  |  37<H>  ----------------------------<  162<H>  5.0   |  29  |  1.10    Ca    9.9      15 Oct 2018 06:03  Phos  3.0     10-14  Mg     2.2     10-15      CAPILLARY BLOOD GLUCOSE          Medications:  MEDICATIONS  (STANDING):  docusate sodium 100 milliGRAM(s) Oral three times a day  heparin  Injectable 5000 Unit(s) SubCutaneous every 8 hours  lidocaine   Patch 1 Patch Transdermal daily  pantoprazole    Tablet 40 milliGRAM(s) Oral before breakfast    MEDICATIONS  (PRN):  acetaminophen   Tablet .. 650 milliGRAM(s) Oral every 6 hours PRN Mild Pain (1 - 3)  ALBUTerol    0.083% 2.5 milliGRAM(s) Nebulizer every 6 hours PRN Shortness of Breath and/or Wheezing  ipratropium    for Nebulization 500 MICROGram(s) Nebulizer every 6 hours PRN Shortness of Breath and/or Wheezing  magnesium hydroxide Suspension 30 milliLiter(s) Oral daily PRN Constipation  senna 2 Tablet(s) Oral at bedtime PRN Constipation      Weight: 54.1kg  Daily     Daily     Weight Change: 2kg weight gain    Estimated energy needs: Utilized ABW to calculate needs, pt falls within % of IBW. Adjusted for hypermetabolic state/post-op.  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 Now s/p Mediastinoscopy, Lymph Node dissection, Left VATs, Robotic Assisted Left Upper Lobectomy, Bronchioplasty. Pt seen in chair with daughter at bedside. Pt speaks some English, but daughter translated some. Pt w/poor appetite 2/2 pain and lack of appetite. Family bringing in foods from outside. Drinking 2-3 Ensure per day though. She denies N/V/C/D, +BM 10/15. Minimal ambulation 2/2 weakness. Needs encouragement. Food preferences obtained.     Previous Nutrition Diagnosis:  Increased nutrient needs RT increased protein/kcal demand AEB post-op/hypermetabolic state    Active [ X  ]  Resolved [   ]    If resolved, new PES:     Goal: Pt will be encouraged to meet >75% of EER per day    Recommendations:  1. Encourage PO intake  2. Monitor lytes and replete prn.   3. Trend weights   4. Obtain food preferences    Education: High protein foods; small, frequent meals     Risk Level: High [ X  ] Moderate [   ] Low [   ]

## 2018-10-15 NOTE — PROGRESS NOTE ADULT - ASSESSMENT
83 year old Female with history of multiple mechanical falls, with recent workup for mechanical fall found to have VIKASH nodule with hilar node lymphadenopathy. Patient was seen by Dr. Hathaway and deemed a surgical candidate.  She presented to [facility] on 10/9/18 for elective mediastinoscopy, LVATS robotic LULx and MLND.  She arrived to CTICU in stable condition, extubated after uncomplicated intraop course.  Transferred to floor care on POD 1 after uncomplicated ICU course. She has remained inpatient for medical optimization and physical therapy.  Now POD 6, no acute events overnight.       Neurovascular: Stable.   - Avoid narcotics 2/2 dizziness/sedation, continue tylenol PRN   -Lidoderm patch    Cardiovascular: Hemodynamically stable. Heartrate controlled.   -No active issues   -LE duplex negative for DVT  -monitor HR/Bp/tele    Respiratory: Saturates well on RA.  POD 6 s/p L VATS LULx, MDLN, bronchoplasty, mediastinoscopy   - Surgical pathology final dx: +invasive adenocarcinoma with metastatic carcinoma involving two of three peribronchial lymph nodes.  - CXR stable with improvement of atelectasis, repeat in AM  - Pulls 500cc on IS, needs a lot of encouragement.   - Encourage frequent ambulation.   - Continue to endorse necessity of SCDs.  - Encourage C+DB and Use of IS 10x / hr while awake.    GI: Stable.  - Continue PO diet, Encourage patient to take PO  - Pantoprazole for GI PPX  - Continue bowel regimen     Renal / : BUN/Cr stable,  no active issues   - Monitor renal function.  - Monitor I/O's.    Endocrine: A1C: 5.4, TSH 0.712   - No active issues      Prophylaxis:  - DVT prophylaxis with 5000 SubQ Heparin q8h.   - SCD's!    Disposition:   Home tomorrow

## 2018-10-16 VITALS — TEMPERATURE: 98 F

## 2018-10-16 PROCEDURE — 71045 X-RAY EXAM CHEST 1 VIEW: CPT | Mod: 26

## 2018-10-16 RX ORDER — LIDOCAINE 4 G/100G
1 CREAM TOPICAL
Qty: 14 | Refills: 0
Start: 2018-10-16 | End: 2018-10-29

## 2018-10-16 RX ORDER — ACETAMINOPHEN 500 MG
2 TABLET ORAL
Qty: 240 | Refills: 0 | OUTPATIENT
Start: 2018-10-16 | End: 2018-11-14

## 2018-10-16 RX ORDER — IBUPROFEN 200 MG
1 TABLET ORAL
Qty: 120 | Refills: 0 | OUTPATIENT
Start: 2018-10-16 | End: 2018-11-14

## 2018-10-16 RX ORDER — LIDOCAINE 4 G/100G
1 CREAM TOPICAL
Qty: 14 | Refills: 0 | OUTPATIENT
Start: 2018-10-16 | End: 2018-10-29

## 2018-10-16 RX ORDER — DOCUSATE SODIUM 100 MG
1 CAPSULE ORAL
Qty: 90 | Refills: 0 | OUTPATIENT
Start: 2018-10-16 | End: 2018-11-14

## 2018-10-16 RX ORDER — PANTOPRAZOLE SODIUM 20 MG/1
1 TABLET, DELAYED RELEASE ORAL
Qty: 30 | Refills: 0 | OUTPATIENT
Start: 2018-10-16 | End: 2018-11-14

## 2018-10-16 RX ORDER — SENNA PLUS 8.6 MG/1
2 TABLET ORAL
Qty: 60 | Refills: 0 | OUTPATIENT
Start: 2018-10-16 | End: 2018-11-14

## 2018-10-16 RX ADMIN — PANTOPRAZOLE SODIUM 40 MILLIGRAM(S): 20 TABLET, DELAYED RELEASE ORAL at 05:30

## 2018-10-16 RX ADMIN — LIDOCAINE 1 PATCH: 4 CREAM TOPICAL at 00:35

## 2018-10-16 RX ADMIN — Medication 650 MILLIGRAM(S): at 05:41

## 2018-10-16 RX ADMIN — LIDOCAINE 1 PATCH: 4 CREAM TOPICAL at 11:08

## 2018-10-16 RX ADMIN — HEPARIN SODIUM 5000 UNIT(S): 5000 INJECTION INTRAVENOUS; SUBCUTANEOUS at 05:30

## 2018-10-16 RX ADMIN — Medication 650 MILLIGRAM(S): at 13:24

## 2018-10-16 RX ADMIN — Medication 100 MILLIGRAM(S): at 05:30

## 2018-10-16 NOTE — PROGRESS NOTE ADULT - ASSESSMENT
-Please follow up with Dr. Hathaway on 10/25/18 at 12:45pm.  The office is located at [facility], [facility], 4th floor. Call us with any questions #[phone].     Please follow-up with Dr. Carlos Alberto Andrews on 10/29/18 at 3:00pm.  His office information is as follows:  Address: [address] [place] [address], [place] [address]  Phone: [phone].    While inpatient, you did not tolerate the effects of narcotics and have not been given a home prescription for pain medicines containing narcotics.  Because you are so sensitive to pain medications, we have prescribed the following regimen:  1. Tylenol 325mg Tablets: Take 1-2 tablets by mouth every 6 hours as needed for pain.  You can only take up to 4000mg (4 grams) of tylenol in a 24 hour period.   2. Motrin (Ibuprofen): 600mg Tablet.  Take 1 tablet by mouth every 6 hours as needed for pain.    -You should alternate dosing of Tylenol and Motrin to maximize the effects and keep your pain better controlled.  An example of this is to take Tylenol at 6am then take Motrin 3 hours later at 9am in between your Tylenol doses.  3. Lidocaine Patches: Apply 1 patch to the painful area and keep on for 12 hours.  Remove after 12 hours and keep the area without a patch for 12 hours until application of next patch.   -A written prescription has been supplied for you to fill at your local pharmacy.

## 2018-10-16 NOTE — PROGRESS NOTE ADULT - PROVIDER SPECIALTY LIST ADULT
CT Surgery
Thoracic Surgery
Critical Care

## 2018-10-16 NOTE — PROGRESS NOTE ADULT - SUBJECTIVE AND OBJECTIVE BOX
Patient discussed on morning rounds with Dr. Hathaway     Operation / Date: 10/9/18 L VATS LULx, MLND bronchoplasty, mediastinoscopy     Surgeon: Dr. Hathaway     Referring Physician: Dr. Andrews    SUBJECTIVE ASSESSMENT:  83y Female seen at bedside this AM.  She feels well overall without acute overnight events but does continue to have incisional pain that is moderately controlled with current medication regimen.  Denies HA, AMS, CP, palpitations, SOB, cough, hemoptysis, n/v/d, fever. Feel ready to go home today.     Hospital Course:  This is an 83 year old Female with history of multiple mechanical falls, with recent workup for mechanical fall found to have VIKASH nodule with hilar node lymphadenopathy. Patient was seen by Dr. Hathaway and deemed a surgical candidate.  She presented to [facility] on 10/9/18 for elective mediastinoscopy, LVATS robotic LULx and MLND.  She arrived to CTICU in stable condition, extubated after uncomplicated intraop course.  Transferred to floor care on POD 1 after uncomplicated ICU course, chest tube removed on POD 1 with stable f/u CXR.  The remainder of her hospital course remained uncomplicated in regard to hemodynamics.  Patient continued to work with physical therapy and was medically optimized for discharge to home on 10/16/18, POD 7.  Patient continues to endorse significant post-operative pain despite pain regimen while inpatient.  After discussion with Dr. Hathaway and in regard to patient's known narcotic intolerance, she was prescribed home pain regimen and will follow-up with Dr. Hathaway in one week.  Over 30 minutes was spent with the patient reviewing the discharge material including medications, follow up appointments, recovery, concerning symptoms, and how to contact their health care providers if they have questions.     Vital Signs Last 24 Hrs  T(C): 37.2 (16 Oct 2018 09:47), Max: 37.2 (16 Oct 2018 09:47)  T(F): 99 (16 Oct 2018 09:47), Max: 99 (16 Oct 2018 09:47)  HR: 92 (16 Oct 2018 08:37) (89 - 100)  BP: 126/66 (16 Oct 2018 08:37) (113/67 - 129/63)  BP(mean): 84 (16 Oct 2018 08:37) (80 - 88)  RR: 16 (16 Oct 2018 08:37) (16 - 18)  SpO2: 97% (16 Oct 2018 08:37) (96% - 97%)  I&O's Detail    15 Oct 2018 07:01  -  16 Oct 2018 07:00  --------------------------------------------------------  IN:    0.9% NaCl: 300 mL    IV PiggyBack: 100 mL    Oral Fluid: 1071 mL  Total IN: 1471 mL    OUT:    Voided: 800 mL  Total OUT: 800 mL    Total NET: 671 mL    EPICARDIAL WIRES REMOVED: NA.  TIE DOWNS REMOVED: No.    PHYSICAL EXAM:  General: OOB to chair, no acute distress.   Neurological: AAOx3, no AMS or focal deficits.   Cardiovascular: RRR, S1/S2, no m/r/g.   Respiratory: No acute distress, CTA b/l, no w/r/r.   Gastrointestinal: ND, NBS, non-TTP.  Extremities: Warm and well perfused, no calf ttp b/l.  No edema b/l.   Vascular: Pulses 2+ throughout.   Incision sites: VATS: C/D/I    LABS:    COUMADIN:  No    10-15    136  |  97  |  37<H>  ----------------------------<  162<H>  5.0   |  29  |  1.10    Ca    9.9      15 Oct 2018 06:03  Mg     2.2     10-15    MEDICATIONS  (STANDING):  I will START or STAY ON the medications listed below when I get home from the hospital:    acetaminophen 325 mg oral tablet  -- 2 tab(s) by mouth every 6 hours, As needed, Mild Pain (1 - 3) MDD:8  -- Indication: For Pain, alternate with motrin    ibuprofen 600 mg oral tablet  -- 1 tab(s) by mouth every 6 hours MDD:4  -- Do not take this drug if you are pregnant.  It is very important that you take or use this exactly as directed.  Do not skip doses or discontinue unless directed by your doctor.  May cause drowsiness or dizziness.  Obtain medical advice before taking any non-prescription drugs as some may affect the action of this medication.  Take with food or milk.    -- Indication: For Pain, alternate with Tylenol    Lidoderm 5% topical film  -- Apply on skin to affected area once a day   -- For external use only.  Remove old patch prior to applying a new patch.    -- Indication: For Incisional pain    docusate sodium 100 mg oral capsule  -- 1 cap(s) by mouth 3 times a day, As Needed -for constipation   -- Indication: For Stool softener    senna oral tablet  -- 2 tab(s) by mouth once a day (at bedtime), As Needed -Constipation - for constipation   -- Indication: For Stool softener    pantoprazole 40 mg oral delayed release tablet  -- 1 tab(s) by mouth once a day (before a meal)  -- Indication: For Ulcer prevention.     I will STOP taking the medications listed below when I get home from the hospital:  None.     I will SWITCH the dose or number of times a day I take the medications listed below when I get home from the hospital:  None.      Discharge CXR:  < from: Xray Chest 1 View- PORTABLE-Routine (10.15.18 @ 06:06) >    EXAM:  XR CHEST PORTABLE ROUTINE 1V                          PROCEDURE DATE:  10/15/2018          INTERPRETATION:  AP Portable CXR dated 10/15/2018 6:06 AM    CLINICAL INFORMATION: post-op    PRIOR STUDIES: Chest radiograph dated 10/14/2018    FINDINGS: The cardiomediastinal silhouette is unchanged. Again left upper   lung zone postsurgical changes and evidence of left lung volume is noted.   Status post left upper lobe lobectomy. Small left apical pleural   effusion. Retrocardiac opacity suggesting atelectasis. Resolution of   right basilar atelectasis. No pleural effusion.    IMPRESSION:  Resolution of right basilar atelectasis.    < end of copied text >

## 2018-10-18 ENCOUNTER — APPOINTMENT (OUTPATIENT)
Dept: THORACIC SURGERY | Facility: CLINIC | Age: 83
End: 2018-10-18

## 2018-10-18 DIAGNOSIS — E78.5 HYPERLIPIDEMIA, UNSPECIFIED: ICD-10-CM

## 2018-10-18 DIAGNOSIS — C77.1 SECONDARY AND UNSPECIFIED MALIGNANT NEOPLASM OF INTRATHORACIC LYMPH NODES: ICD-10-CM

## 2018-10-18 DIAGNOSIS — E04.1 NONTOXIC SINGLE THYROID NODULE: ICD-10-CM

## 2018-10-18 DIAGNOSIS — Z88.1 ALLERGY STATUS TO OTHER ANTIBIOTIC AGENTS STATUS: ICD-10-CM

## 2018-10-18 DIAGNOSIS — G89.18 OTHER ACUTE POSTPROCEDURAL PAIN: ICD-10-CM

## 2018-10-18 DIAGNOSIS — Z91.81 HISTORY OF FALLING: ICD-10-CM

## 2018-10-18 DIAGNOSIS — C34.12 MALIGNANT NEOPLASM OF UPPER LOBE, LEFT BRONCHUS OR LUNG: ICD-10-CM

## 2018-10-24 ENCOUNTER — FORM ENCOUNTER (OUTPATIENT)
Age: 83
End: 2018-10-24

## 2018-10-24 DIAGNOSIS — Z09 ENCOUNTER FOR FOLLOW-UP EXAMINATION AFTER COMPLETED TREATMENT FOR CONDITIONS OTHER THAN MALIGNANT NEOPLASM: ICD-10-CM

## 2018-10-25 ENCOUNTER — APPOINTMENT (OUTPATIENT)
Dept: THORACIC SURGERY | Facility: CLINIC | Age: 83
End: 2018-10-25
Payer: MEDICARE

## 2018-10-25 ENCOUNTER — OUTPATIENT (OUTPATIENT)
Dept: OUTPATIENT SERVICES | Facility: HOSPITAL | Age: 83
LOS: 1 days | End: 2018-10-25
Payer: MEDICARE

## 2018-10-25 VITALS
HEART RATE: 71 BPM | SYSTOLIC BLOOD PRESSURE: 144 MMHG | WEIGHT: 118 LBS | BODY MASS INDEX: 20.9 KG/M2 | DIASTOLIC BLOOD PRESSURE: 73 MMHG | OXYGEN SATURATION: 96 % | TEMPERATURE: 97.4 F | RESPIRATION RATE: 18 BRPM

## 2018-10-25 DIAGNOSIS — N60.02 SOLITARY CYST OF LEFT BREAST: Chronic | ICD-10-CM

## 2018-10-25 PROCEDURE — 71046 X-RAY EXAM CHEST 2 VIEWS: CPT | Mod: 26

## 2018-10-25 PROCEDURE — 71046 X-RAY EXAM CHEST 2 VIEWS: CPT

## 2018-10-25 PROCEDURE — 99024 POSTOP FOLLOW-UP VISIT: CPT

## 2018-10-29 ENCOUNTER — OUTPATIENT (OUTPATIENT)
Dept: OUTPATIENT SERVICES | Facility: HOSPITAL | Age: 83
LOS: 1 days | Discharge: HOME | End: 2018-10-29

## 2018-10-29 DIAGNOSIS — N60.02 SOLITARY CYST OF LEFT BREAST: Chronic | ICD-10-CM

## 2018-10-29 DIAGNOSIS — C34.90 MALIGNANT NEOPLASM OF UNSPECIFIED PART OF UNSPECIFIED BRONCHUS OR LUNG: ICD-10-CM

## 2018-11-05 ENCOUNTER — RESULT REVIEW (OUTPATIENT)
Age: 83
End: 2018-11-05

## 2018-11-06 ENCOUNTER — APPOINTMENT (OUTPATIENT)
Dept: INFUSION THERAPY | Facility: HOSPITAL | Age: 83
End: 2018-11-06

## 2018-11-06 ENCOUNTER — APPOINTMENT (OUTPATIENT)
Dept: HEMATOLOGY ONCOLOGY | Facility: CLINIC | Age: 83
End: 2018-11-06
Payer: MEDICARE

## 2018-11-06 VITALS
TEMPERATURE: 98.2 F | BODY MASS INDEX: 20.83 KG/M2 | DIASTOLIC BLOOD PRESSURE: 82 MMHG | SYSTOLIC BLOOD PRESSURE: 157 MMHG | HEIGHT: 63.39 IN | HEART RATE: 102 BPM | WEIGHT: 119.05 LBS | RESPIRATION RATE: 18 BRPM | OXYGEN SATURATION: 95 %

## 2018-11-06 DIAGNOSIS — Z78.9 OTHER SPECIFIED HEALTH STATUS: ICD-10-CM

## 2018-11-06 PROCEDURE — 99205 OFFICE O/P NEW HI 60 MIN: CPT

## 2018-11-07 DIAGNOSIS — Z51.11 ENCOUNTER FOR ANTINEOPLASTIC CHEMOTHERAPY: ICD-10-CM

## 2018-11-11 PROCEDURE — 82962 GLUCOSE BLOOD TEST: CPT

## 2018-11-11 PROCEDURE — 84100 ASSAY OF PHOSPHORUS: CPT

## 2018-11-11 PROCEDURE — 82803 BLOOD GASES ANY COMBINATION: CPT

## 2018-11-11 PROCEDURE — 97116 GAIT TRAINING THERAPY: CPT

## 2018-11-11 PROCEDURE — 85730 THROMBOPLASTIN TIME PARTIAL: CPT

## 2018-11-11 PROCEDURE — 36415 COLL VENOUS BLD VENIPUNCTURE: CPT

## 2018-11-11 PROCEDURE — 88305 TISSUE EXAM BY PATHOLOGIST: CPT

## 2018-11-11 PROCEDURE — 93970 EXTREMITY STUDY: CPT

## 2018-11-11 PROCEDURE — 84132 ASSAY OF SERUM POTASSIUM: CPT

## 2018-11-11 PROCEDURE — 86900 BLOOD TYPING SEROLOGIC ABO: CPT

## 2018-11-11 PROCEDURE — 80048 BASIC METABOLIC PNL TOTAL CA: CPT

## 2018-11-11 PROCEDURE — 88309 TISSUE EXAM BY PATHOLOGIST: CPT

## 2018-11-11 PROCEDURE — 86850 RBC ANTIBODY SCREEN: CPT

## 2018-11-11 PROCEDURE — 84443 ASSAY THYROID STIM HORMONE: CPT

## 2018-11-11 PROCEDURE — 88331 PATH CONSLTJ SURG 1 BLK 1SPC: CPT

## 2018-11-11 PROCEDURE — 85610 PROTHROMBIN TIME: CPT

## 2018-11-11 PROCEDURE — 83735 ASSAY OF MAGNESIUM: CPT

## 2018-11-11 PROCEDURE — 82330 ASSAY OF CALCIUM: CPT

## 2018-11-11 PROCEDURE — 71045 X-RAY EXAM CHEST 1 VIEW: CPT

## 2018-11-11 PROCEDURE — 83036 HEMOGLOBIN GLYCOSYLATED A1C: CPT

## 2018-11-11 PROCEDURE — 85027 COMPLETE CBC AUTOMATED: CPT

## 2018-11-11 PROCEDURE — 84295 ASSAY OF SERUM SODIUM: CPT

## 2018-11-11 PROCEDURE — 80053 COMPREHEN METABOLIC PANEL: CPT

## 2018-11-11 PROCEDURE — 86901 BLOOD TYPING SEROLOGIC RH(D): CPT

## 2018-11-11 PROCEDURE — 97161 PT EVAL LOW COMPLEX 20 MIN: CPT

## 2018-11-11 PROCEDURE — C1894: CPT

## 2018-11-11 PROCEDURE — S2900: CPT

## 2018-11-11 PROCEDURE — C1889: CPT

## 2018-11-12 RX ORDER — DEXAMETHASONE 4 MG/1
4 TABLET ORAL
Qty: 30 | Refills: 3 | Status: ACTIVE | COMMUNITY
Start: 2018-11-12 | End: 1900-01-01

## 2018-11-12 RX ORDER — FOLIC ACID 1 MG/1
1 TABLET ORAL
Qty: 30 | Refills: 5 | Status: ACTIVE | COMMUNITY
Start: 2018-11-12 | End: 1900-01-01

## 2018-11-12 RX ORDER — METOCLOPRAMIDE 10 MG/1
10 TABLET ORAL
Qty: 60 | Refills: 3 | Status: ACTIVE | COMMUNITY
Start: 2018-11-12 | End: 1900-01-01

## 2018-11-15 ENCOUNTER — APPOINTMENT (OUTPATIENT)
Dept: THORACIC SURGERY | Facility: CLINIC | Age: 83
End: 2018-11-15

## 2018-11-20 ENCOUNTER — LABORATORY RESULT (OUTPATIENT)
Age: 83
End: 2018-11-20

## 2018-11-20 ENCOUNTER — RESULT REVIEW (OUTPATIENT)
Age: 83
End: 2018-11-20

## 2018-11-20 ENCOUNTER — APPOINTMENT (OUTPATIENT)
Dept: INFUSION THERAPY | Facility: HOSPITAL | Age: 83
End: 2018-11-20

## 2018-11-20 LAB
BASOPHILS # BLD AUTO: 0 K/UL — SIGNIFICANT CHANGE UP (ref 0–0.2)
BASOPHILS NFR BLD AUTO: 0 % — SIGNIFICANT CHANGE UP (ref 0–2)
EOSINOPHIL # BLD AUTO: 0.1 K/UL — SIGNIFICANT CHANGE UP (ref 0–0.5)
EOSINOPHIL NFR BLD AUTO: 1 % — SIGNIFICANT CHANGE UP (ref 0–6)
HCT VFR BLD CALC: 32.9 % — LOW (ref 34.5–45)
HGB BLD-MCNC: 11.5 G/DL — SIGNIFICANT CHANGE UP (ref 11.5–15.5)
LYMPHOCYTES # BLD AUTO: 17.1 % — SIGNIFICANT CHANGE UP (ref 13–44)
LYMPHOCYTES # BLD AUTO: 2 K/UL — SIGNIFICANT CHANGE UP (ref 1–3.3)
MCHC RBC-ENTMCNC: 30.5 PG — SIGNIFICANT CHANGE UP (ref 27–34)
MCHC RBC-ENTMCNC: 34.8 G/DL — SIGNIFICANT CHANGE UP (ref 32–36)
MCV RBC AUTO: 87.6 FL — SIGNIFICANT CHANGE UP (ref 80–100)
MONOCYTES # BLD AUTO: 0.9 K/UL — SIGNIFICANT CHANGE UP (ref 0–0.9)
MONOCYTES NFR BLD AUTO: 7.8 % — SIGNIFICANT CHANGE UP (ref 2–14)
NEUTROPHILS # BLD AUTO: 8.9 K/UL — HIGH (ref 1.8–7.4)
NEUTROPHILS NFR BLD AUTO: 74.1 % — SIGNIFICANT CHANGE UP (ref 43–77)
PLATELET # BLD AUTO: 284 K/UL — SIGNIFICANT CHANGE UP (ref 150–400)
RBC # BLD: 3.76 M/UL — LOW (ref 3.8–5.2)
RBC # FLD: 12.2 % — SIGNIFICANT CHANGE UP (ref 10.3–14.5)
WBC # BLD: 12 K/UL — HIGH (ref 3.8–10.5)
WBC # FLD AUTO: 12 K/UL — HIGH (ref 3.8–10.5)

## 2018-11-21 ENCOUNTER — APPOINTMENT (OUTPATIENT)
Dept: HEMATOLOGY ONCOLOGY | Facility: CLINIC | Age: 83
End: 2018-11-21

## 2018-11-21 DIAGNOSIS — R11.2 NAUSEA WITH VOMITING, UNSPECIFIED: ICD-10-CM

## 2018-11-23 ENCOUNTER — OTHER (OUTPATIENT)
Age: 83
End: 2018-11-23

## 2018-11-26 ENCOUNTER — OUTPATIENT (OUTPATIENT)
Dept: OUTPATIENT SERVICES | Facility: HOSPITAL | Age: 83
LOS: 1 days | Discharge: HOME | End: 2018-11-26

## 2018-11-26 DIAGNOSIS — N60.02 SOLITARY CYST OF LEFT BREAST: Chronic | ICD-10-CM

## 2018-11-26 DIAGNOSIS — C34.90 MALIGNANT NEOPLASM OF UNSPECIFIED PART OF UNSPECIFIED BRONCHUS OR LUNG: ICD-10-CM

## 2018-11-30 ENCOUNTER — RESULT REVIEW (OUTPATIENT)
Age: 83
End: 2018-11-30

## 2018-11-30 ENCOUNTER — APPOINTMENT (OUTPATIENT)
Dept: HEMATOLOGY ONCOLOGY | Facility: CLINIC | Age: 83
End: 2018-11-30
Payer: MEDICARE

## 2018-11-30 VITALS
TEMPERATURE: 98.6 F | BODY MASS INDEX: 20.83 KG/M2 | RESPIRATION RATE: 16 BRPM | OXYGEN SATURATION: 96 % | WEIGHT: 119.05 LBS | DIASTOLIC BLOOD PRESSURE: 80 MMHG | SYSTOLIC BLOOD PRESSURE: 147 MMHG | HEART RATE: 95 BPM

## 2018-11-30 LAB
BASOPHILS # BLD AUTO: 0 K/UL — SIGNIFICANT CHANGE UP (ref 0–0.2)
BASOPHILS NFR BLD AUTO: 0.3 % — SIGNIFICANT CHANGE UP (ref 0–2)
EOSINOPHIL # BLD AUTO: 0 K/UL — SIGNIFICANT CHANGE UP (ref 0–0.5)
EOSINOPHIL NFR BLD AUTO: 1 % — SIGNIFICANT CHANGE UP (ref 0–6)
HCT VFR BLD CALC: 32.8 % — LOW (ref 34.5–45)
HGB BLD-MCNC: 11 G/DL — LOW (ref 11.5–15.5)
LYMPHOCYTES # BLD AUTO: 1.5 K/UL — SIGNIFICANT CHANGE UP (ref 1–3.3)
LYMPHOCYTES # BLD AUTO: 35.6 % — SIGNIFICANT CHANGE UP (ref 13–44)
MCHC RBC-ENTMCNC: 29.9 PG — SIGNIFICANT CHANGE UP (ref 27–34)
MCHC RBC-ENTMCNC: 33.6 G/DL — SIGNIFICANT CHANGE UP (ref 32–36)
MCV RBC AUTO: 89 FL — SIGNIFICANT CHANGE UP (ref 80–100)
MONOCYTES # BLD AUTO: 0.4 K/UL — SIGNIFICANT CHANGE UP (ref 0–0.9)
MONOCYTES NFR BLD AUTO: 10.1 % — SIGNIFICANT CHANGE UP (ref 2–14)
NEUTROPHILS # BLD AUTO: 2.3 K/UL — SIGNIFICANT CHANGE UP (ref 1.8–7.4)
NEUTROPHILS NFR BLD AUTO: 53 % — SIGNIFICANT CHANGE UP (ref 43–77)
PLATELET # BLD AUTO: 212 K/UL — SIGNIFICANT CHANGE UP (ref 150–400)
RBC # BLD: 3.69 M/UL — LOW (ref 3.8–5.2)
RBC # FLD: 12.1 % — SIGNIFICANT CHANGE UP (ref 10.3–14.5)
WBC # BLD: 4.3 K/UL — SIGNIFICANT CHANGE UP (ref 3.8–10.5)
WBC # FLD AUTO: 4.3 K/UL — SIGNIFICANT CHANGE UP (ref 3.8–10.5)

## 2018-11-30 PROCEDURE — 99215 OFFICE O/P EST HI 40 MIN: CPT

## 2018-12-03 LAB
ALBUMIN SERPL ELPH-MCNC: 4.3 G/DL
ALP BLD-CCNC: 80 U/L
ALT SERPL-CCNC: 21 U/L
ANION GAP SERPL CALC-SCNC: 12 MMOL/L
AST SERPL-CCNC: 20 U/L
BILIRUB SERPL-MCNC: 0.3 MG/DL
BUN SERPL-MCNC: 22 MG/DL
CALCIUM SERPL-MCNC: 9.5 MG/DL
CHLORIDE SERPL-SCNC: 99 MMOL/L
CO2 SERPL-SCNC: 26 MMOL/L
CREAT SERPL-MCNC: 0.95 MG/DL
GLUCOSE SERPL-MCNC: 124 MG/DL
POTASSIUM SERPL-SCNC: 5.2 MMOL/L
PROT SERPL-MCNC: 6.7 G/DL
SODIUM SERPL-SCNC: 137 MMOL/L

## 2018-12-11 ENCOUNTER — RESULT REVIEW (OUTPATIENT)
Age: 83
End: 2018-12-11

## 2018-12-11 ENCOUNTER — APPOINTMENT (OUTPATIENT)
Dept: INFUSION THERAPY | Facility: HOSPITAL | Age: 83
End: 2018-12-11

## 2018-12-11 LAB
BASOPHILS # BLD AUTO: 0 K/UL — SIGNIFICANT CHANGE UP (ref 0–0.2)
BASOPHILS NFR BLD AUTO: 0.4 % — SIGNIFICANT CHANGE UP (ref 0–2)
EOSINOPHIL # BLD AUTO: 0.1 K/UL — SIGNIFICANT CHANGE UP (ref 0–0.5)
EOSINOPHIL NFR BLD AUTO: 1.6 % — SIGNIFICANT CHANGE UP (ref 0–6)
HCT VFR BLD CALC: 30.9 % — LOW (ref 34.5–45)
HGB BLD-MCNC: 10.7 G/DL — LOW (ref 11.5–15.5)
LYMPHOCYTES # BLD AUTO: 1.5 K/UL — SIGNIFICANT CHANGE UP (ref 1–3.3)
LYMPHOCYTES # BLD AUTO: 25.5 % — SIGNIFICANT CHANGE UP (ref 13–44)
MCHC RBC-ENTMCNC: 30.9 PG — SIGNIFICANT CHANGE UP (ref 27–34)
MCHC RBC-ENTMCNC: 34.7 G/DL — SIGNIFICANT CHANGE UP (ref 32–36)
MCV RBC AUTO: 89.1 FL — SIGNIFICANT CHANGE UP (ref 80–100)
MONOCYTES # BLD AUTO: 0.6 K/UL — SIGNIFICANT CHANGE UP (ref 0–0.9)
MONOCYTES NFR BLD AUTO: 10 % — SIGNIFICANT CHANGE UP (ref 2–14)
NEUTROPHILS # BLD AUTO: 3.8 K/UL — SIGNIFICANT CHANGE UP (ref 1.8–7.4)
NEUTROPHILS NFR BLD AUTO: 62.5 % — SIGNIFICANT CHANGE UP (ref 43–77)
PLATELET # BLD AUTO: 280 K/UL — SIGNIFICANT CHANGE UP (ref 150–400)
RBC # BLD: 3.47 M/UL — LOW (ref 3.8–5.2)
RBC # FLD: 14.2 % — SIGNIFICANT CHANGE UP (ref 10.3–14.5)
WBC # BLD: 6 K/UL — SIGNIFICANT CHANGE UP (ref 3.8–10.5)
WBC # FLD AUTO: 6 K/UL — SIGNIFICANT CHANGE UP (ref 3.8–10.5)

## 2018-12-12 DIAGNOSIS — Z51.11 ENCOUNTER FOR ANTINEOPLASTIC CHEMOTHERAPY: ICD-10-CM

## 2018-12-12 DIAGNOSIS — R11.2 NAUSEA WITH VOMITING, UNSPECIFIED: ICD-10-CM

## 2018-12-21 ENCOUNTER — RESULT REVIEW (OUTPATIENT)
Age: 83
End: 2018-12-21

## 2018-12-21 ENCOUNTER — APPOINTMENT (OUTPATIENT)
Dept: HEMATOLOGY ONCOLOGY | Facility: CLINIC | Age: 83
End: 2018-12-21
Payer: MEDICARE

## 2018-12-21 VITALS
HEART RATE: 109 BPM | BODY MASS INDEX: 20.56 KG/M2 | SYSTOLIC BLOOD PRESSURE: 123 MMHG | TEMPERATURE: 98.8 F | RESPIRATION RATE: 16 BRPM | WEIGHT: 117.48 LBS | OXYGEN SATURATION: 98 % | DIASTOLIC BLOOD PRESSURE: 74 MMHG

## 2018-12-21 LAB
BASOPHILS # BLD AUTO: 0 K/UL — SIGNIFICANT CHANGE UP (ref 0–0.2)
BASOPHILS NFR BLD AUTO: 0.2 % — SIGNIFICANT CHANGE UP (ref 0–2)
EOSINOPHIL # BLD AUTO: 0 K/UL — SIGNIFICANT CHANGE UP (ref 0–0.5)
EOSINOPHIL NFR BLD AUTO: 0.4 % — SIGNIFICANT CHANGE UP (ref 0–6)
HCT VFR BLD CALC: 29.4 % — LOW (ref 34.5–45)
HGB BLD-MCNC: 10.1 G/DL — LOW (ref 11.5–15.5)
LYMPHOCYTES # BLD AUTO: 1.6 K/UL — SIGNIFICANT CHANGE UP (ref 1–3.3)
LYMPHOCYTES # BLD AUTO: 37.9 % — SIGNIFICANT CHANGE UP (ref 13–44)
MCHC RBC-ENTMCNC: 30.8 PG — SIGNIFICANT CHANGE UP (ref 27–34)
MCHC RBC-ENTMCNC: 34.4 G/DL — SIGNIFICANT CHANGE UP (ref 32–36)
MCV RBC AUTO: 89.7 FL — SIGNIFICANT CHANGE UP (ref 80–100)
MONOCYTES # BLD AUTO: 0.5 K/UL — SIGNIFICANT CHANGE UP (ref 0–0.9)
MONOCYTES NFR BLD AUTO: 11.3 % — SIGNIFICANT CHANGE UP (ref 2–14)
NEUTROPHILS # BLD AUTO: 2.1 K/UL — SIGNIFICANT CHANGE UP (ref 1.8–7.4)
NEUTROPHILS NFR BLD AUTO: 50.2 % — SIGNIFICANT CHANGE UP (ref 43–77)
PLATELET # BLD AUTO: 154 K/UL — SIGNIFICANT CHANGE UP (ref 150–400)
RBC # BLD: 3.28 M/UL — LOW (ref 3.8–5.2)
RBC # FLD: 14.1 % — SIGNIFICANT CHANGE UP (ref 10.3–14.5)
WBC # BLD: 4.2 K/UL — SIGNIFICANT CHANGE UP (ref 3.8–10.5)
WBC # FLD AUTO: 4.2 K/UL — SIGNIFICANT CHANGE UP (ref 3.8–10.5)

## 2018-12-21 PROCEDURE — 99214 OFFICE O/P EST MOD 30 MIN: CPT

## 2018-12-21 RX ORDER — BENZONATATE 100 MG/1
100 CAPSULE ORAL
Qty: 90 | Refills: 5 | Status: ACTIVE | COMMUNITY
Start: 2018-12-21 | End: 1900-01-01

## 2018-12-24 ENCOUNTER — OUTPATIENT (OUTPATIENT)
Dept: OUTPATIENT SERVICES | Facility: HOSPITAL | Age: 83
LOS: 1 days | Discharge: HOME | End: 2018-12-24

## 2018-12-24 DIAGNOSIS — C34.90 MALIGNANT NEOPLASM OF UNSPECIFIED PART OF UNSPECIFIED BRONCHUS OR LUNG: ICD-10-CM

## 2018-12-24 DIAGNOSIS — N60.02 SOLITARY CYST OF LEFT BREAST: Chronic | ICD-10-CM

## 2018-12-24 LAB
ALBUMIN SERPL ELPH-MCNC: 4.3 G/DL
ALP BLD-CCNC: 80 U/L
ALT SERPL-CCNC: 21 U/L
ANION GAP SERPL CALC-SCNC: 12 MMOL/L
AST SERPL-CCNC: 20 U/L
BILIRUB SERPL-MCNC: 0.2 MG/DL
BUN SERPL-MCNC: 23 MG/DL
CALCIUM SERPL-MCNC: 9.1 MG/DL
CHLORIDE SERPL-SCNC: 100 MMOL/L
CO2 SERPL-SCNC: 26 MMOL/L
CREAT SERPL-MCNC: 0.89 MG/DL
GLUCOSE SERPL-MCNC: 117 MG/DL
POTASSIUM SERPL-SCNC: 5 MMOL/L
PROT SERPL-MCNC: 6.7 G/DL
SODIUM SERPL-SCNC: 138 MMOL/L

## 2019-01-02 ENCOUNTER — APPOINTMENT (OUTPATIENT)
Dept: INFUSION THERAPY | Facility: HOSPITAL | Age: 84
End: 2019-01-02

## 2019-01-02 ENCOUNTER — RESULT REVIEW (OUTPATIENT)
Age: 84
End: 2019-01-02

## 2019-01-02 LAB
BASOPHILS # BLD AUTO: 0 K/UL — SIGNIFICANT CHANGE UP (ref 0–0.2)
BASOPHILS NFR BLD AUTO: 0.2 % — SIGNIFICANT CHANGE UP (ref 0–2)
EOSINOPHIL # BLD AUTO: 0 K/UL — SIGNIFICANT CHANGE UP (ref 0–0.5)
EOSINOPHIL NFR BLD AUTO: 0.4 % — SIGNIFICANT CHANGE UP (ref 0–6)
HCT VFR BLD CALC: 26.8 % — LOW (ref 34.5–45)
HGB BLD-MCNC: 9.8 G/DL — LOW (ref 11.5–15.5)
LYMPHOCYTES # BLD AUTO: 1.3 K/UL — SIGNIFICANT CHANGE UP (ref 1–3.3)
LYMPHOCYTES # BLD AUTO: 17.2 % — SIGNIFICANT CHANGE UP (ref 13–44)
MCHC RBC-ENTMCNC: 33.2 PG — SIGNIFICANT CHANGE UP (ref 27–34)
MCHC RBC-ENTMCNC: 36.4 G/DL — HIGH (ref 32–36)
MCV RBC AUTO: 91.1 FL — SIGNIFICANT CHANGE UP (ref 80–100)
MONOCYTES # BLD AUTO: 0.8 K/UL — SIGNIFICANT CHANGE UP (ref 0–0.9)
MONOCYTES NFR BLD AUTO: 10.9 % — SIGNIFICANT CHANGE UP (ref 2–14)
NEUTROPHILS # BLD AUTO: 5.4 K/UL — SIGNIFICANT CHANGE UP (ref 1.8–7.4)
NEUTROPHILS NFR BLD AUTO: 71.3 % — SIGNIFICANT CHANGE UP (ref 43–77)
PLATELET # BLD AUTO: 385 K/UL — SIGNIFICANT CHANGE UP (ref 150–400)
RBC # BLD: 2.94 M/UL — LOW (ref 3.8–5.2)
RBC # FLD: 15.4 % — HIGH (ref 10.3–14.5)
WBC # BLD: 7.6 K/UL — SIGNIFICANT CHANGE UP (ref 3.8–10.5)
WBC # FLD AUTO: 7.6 K/UL — SIGNIFICANT CHANGE UP (ref 3.8–10.5)

## 2019-01-03 DIAGNOSIS — R11.2 NAUSEA WITH VOMITING, UNSPECIFIED: ICD-10-CM

## 2019-01-03 DIAGNOSIS — Z51.11 ENCOUNTER FOR ANTINEOPLASTIC CHEMOTHERAPY: ICD-10-CM

## 2019-01-11 ENCOUNTER — RESULT REVIEW (OUTPATIENT)
Age: 84
End: 2019-01-11

## 2019-01-11 ENCOUNTER — APPOINTMENT (OUTPATIENT)
Dept: HEMATOLOGY ONCOLOGY | Facility: CLINIC | Age: 84
End: 2019-01-11
Payer: MEDICARE

## 2019-01-11 VITALS
HEART RATE: 114 BPM | SYSTOLIC BLOOD PRESSURE: 152 MMHG | DIASTOLIC BLOOD PRESSURE: 71 MMHG | RESPIRATION RATE: 17 BRPM | WEIGHT: 115.74 LBS | OXYGEN SATURATION: 97 % | BODY MASS INDEX: 20.25 KG/M2 | TEMPERATURE: 98.1 F

## 2019-01-11 DIAGNOSIS — R59.0 LOCALIZED ENLARGED LYMPH NODES: ICD-10-CM

## 2019-01-11 LAB
BASOPHILS # BLD AUTO: 0 K/UL — SIGNIFICANT CHANGE UP (ref 0–0.2)
BASOPHILS NFR BLD AUTO: 0.5 % — SIGNIFICANT CHANGE UP (ref 0–2)
EOSINOPHIL # BLD AUTO: 0 K/UL — SIGNIFICANT CHANGE UP (ref 0–0.5)
EOSINOPHIL NFR BLD AUTO: 0.6 % — SIGNIFICANT CHANGE UP (ref 0–6)
HCT VFR BLD CALC: 28.2 % — LOW (ref 34.5–45)
HGB BLD-MCNC: 9.6 G/DL — LOW (ref 11.5–15.5)
LYMPHOCYTES # BLD AUTO: 1.5 K/UL — SIGNIFICANT CHANGE UP (ref 1–3.3)
LYMPHOCYTES # BLD AUTO: 34 % — SIGNIFICANT CHANGE UP (ref 13–44)
MCHC RBC-ENTMCNC: 31 PG — SIGNIFICANT CHANGE UP (ref 27–34)
MCHC RBC-ENTMCNC: 34 G/DL — SIGNIFICANT CHANGE UP (ref 32–36)
MCV RBC AUTO: 91.3 FL — SIGNIFICANT CHANGE UP (ref 80–100)
MONOCYTES # BLD AUTO: 0.4 K/UL — SIGNIFICANT CHANGE UP (ref 0–0.9)
MONOCYTES NFR BLD AUTO: 10 % — SIGNIFICANT CHANGE UP (ref 2–14)
NEUTROPHILS # BLD AUTO: 2.4 K/UL — SIGNIFICANT CHANGE UP (ref 1.8–7.4)
NEUTROPHILS NFR BLD AUTO: 54.9 % — SIGNIFICANT CHANGE UP (ref 43–77)
PLATELET # BLD AUTO: 194 K/UL — SIGNIFICANT CHANGE UP (ref 150–400)
RBC # BLD: 3.09 M/UL — LOW (ref 3.8–5.2)
RBC # FLD: 15 % — HIGH (ref 10.3–14.5)
WBC # BLD: 4.5 K/UL — SIGNIFICANT CHANGE UP (ref 3.8–10.5)
WBC # FLD AUTO: 4.5 K/UL — SIGNIFICANT CHANGE UP (ref 3.8–10.5)

## 2019-01-11 PROCEDURE — 99215 OFFICE O/P EST HI 40 MIN: CPT

## 2019-01-15 LAB
ALBUMIN SERPL ELPH-MCNC: 4 G/DL
ALP BLD-CCNC: 76 U/L
ALT SERPL-CCNC: 20 U/L
ANION GAP SERPL CALC-SCNC: 12 MMOL/L
AST SERPL-CCNC: 23 U/L
BILIRUB SERPL-MCNC: 0.3 MG/DL
BUN SERPL-MCNC: 28 MG/DL
CALCIUM SERPL-MCNC: 9 MG/DL
CHLORIDE SERPL-SCNC: 98 MMOL/L
CO2 SERPL-SCNC: 26 MMOL/L
CREAT SERPL-MCNC: 0.87 MG/DL
GLUCOSE SERPL-MCNC: 187 MG/DL
POTASSIUM SERPL-SCNC: 5.4 MMOL/L
PROT SERPL-MCNC: 6.6 G/DL
SODIUM SERPL-SCNC: 136 MMOL/L

## 2019-01-22 ENCOUNTER — RESULT REVIEW (OUTPATIENT)
Age: 84
End: 2019-01-22

## 2019-01-22 ENCOUNTER — LABORATORY RESULT (OUTPATIENT)
Age: 84
End: 2019-01-22

## 2019-01-22 ENCOUNTER — APPOINTMENT (OUTPATIENT)
Dept: INFUSION THERAPY | Facility: HOSPITAL | Age: 84
End: 2019-01-22

## 2019-01-22 LAB
BASOPHILS # BLD AUTO: 0 K/UL — SIGNIFICANT CHANGE UP (ref 0–0.2)
BASOPHILS NFR BLD AUTO: 0.6 % — SIGNIFICANT CHANGE UP (ref 0–2)
EOSINOPHIL # BLD AUTO: 0.1 K/UL — SIGNIFICANT CHANGE UP (ref 0–0.5)
EOSINOPHIL NFR BLD AUTO: 2.4 % — SIGNIFICANT CHANGE UP (ref 0–6)
HCT VFR BLD CALC: 28.4 % — LOW (ref 34.5–45)
HGB BLD-MCNC: 9.8 G/DL — LOW (ref 11.5–15.5)
LYMPHOCYTES # BLD AUTO: 2 K/UL — SIGNIFICANT CHANGE UP (ref 1–3.3)
LYMPHOCYTES # BLD AUTO: 42.2 % — SIGNIFICANT CHANGE UP (ref 13–44)
MCHC RBC-ENTMCNC: 32.5 PG — SIGNIFICANT CHANGE UP (ref 27–34)
MCHC RBC-ENTMCNC: 34.6 G/DL — SIGNIFICANT CHANGE UP (ref 32–36)
MCV RBC AUTO: 94 FL — SIGNIFICANT CHANGE UP (ref 80–100)
MONOCYTES # BLD AUTO: 0.6 K/UL — SIGNIFICANT CHANGE UP (ref 0–0.9)
MONOCYTES NFR BLD AUTO: 12.2 % — SIGNIFICANT CHANGE UP (ref 2–14)
NEUTROPHILS # BLD AUTO: 2 K/UL — SIGNIFICANT CHANGE UP (ref 1.8–7.4)
NEUTROPHILS NFR BLD AUTO: 42.7 % — LOW (ref 43–77)
PLATELET # BLD AUTO: 599 K/UL — HIGH (ref 150–400)
RBC # BLD: 3.03 M/UL — LOW (ref 3.8–5.2)
RBC # FLD: 16.6 % — HIGH (ref 10.3–14.5)
WBC # BLD: 4.6 K/UL — SIGNIFICANT CHANGE UP (ref 3.8–10.5)
WBC # FLD AUTO: 4.6 K/UL — SIGNIFICANT CHANGE UP (ref 3.8–10.5)

## 2019-01-24 ENCOUNTER — OUTPATIENT (OUTPATIENT)
Dept: OUTPATIENT SERVICES | Facility: HOSPITAL | Age: 84
LOS: 1 days | Discharge: HOME | End: 2019-01-24

## 2019-01-24 DIAGNOSIS — N60.02 SOLITARY CYST OF LEFT BREAST: Chronic | ICD-10-CM

## 2019-01-24 DIAGNOSIS — C34.90 MALIGNANT NEOPLASM OF UNSPECIFIED PART OF UNSPECIFIED BRONCHUS OR LUNG: ICD-10-CM

## 2019-01-28 ENCOUNTER — OUTPATIENT (OUTPATIENT)
Dept: OUTPATIENT SERVICES | Facility: HOSPITAL | Age: 84
LOS: 1 days | Discharge: HOME | End: 2019-01-28

## 2019-01-28 DIAGNOSIS — N60.02 SOLITARY CYST OF LEFT BREAST: Chronic | ICD-10-CM

## 2019-02-01 ENCOUNTER — RESULT REVIEW (OUTPATIENT)
Age: 84
End: 2019-02-01

## 2019-02-01 ENCOUNTER — APPOINTMENT (OUTPATIENT)
Dept: HEMATOLOGY ONCOLOGY | Facility: CLINIC | Age: 84
End: 2019-02-01
Payer: MEDICARE

## 2019-02-01 ENCOUNTER — OUTPATIENT (OUTPATIENT)
Dept: OUTPATIENT SERVICES | Facility: HOSPITAL | Age: 84
LOS: 1 days | End: 2019-02-01
Payer: MEDICARE

## 2019-02-01 VITALS
TEMPERATURE: 98 F | BODY MASS INDEX: 19.67 KG/M2 | RESPIRATION RATE: 16 BRPM | HEART RATE: 118 BPM | WEIGHT: 112.44 LBS | DIASTOLIC BLOOD PRESSURE: 70 MMHG | SYSTOLIC BLOOD PRESSURE: 118 MMHG | OXYGEN SATURATION: 99 %

## 2019-02-01 DIAGNOSIS — C34.12 MALIGNANT NEOPLASM OF UPPER LOBE, LEFT BRONCHUS OR LUNG: ICD-10-CM

## 2019-02-01 DIAGNOSIS — N60.02 SOLITARY CYST OF LEFT BREAST: Chronic | ICD-10-CM

## 2019-02-01 LAB
BASOPHILS # BLD AUTO: 0.1 K/UL — SIGNIFICANT CHANGE UP (ref 0–0.2)
BASOPHILS NFR BLD AUTO: 1.9 % — SIGNIFICANT CHANGE UP (ref 0–2)
BLD GP AB SCN SERPL QL: NEGATIVE — SIGNIFICANT CHANGE UP
EOSINOPHIL # BLD AUTO: 0 K/UL — SIGNIFICANT CHANGE UP (ref 0–0.5)
EOSINOPHIL NFR BLD AUTO: 0.3 % — SIGNIFICANT CHANGE UP (ref 0–6)
HCT VFR BLD CALC: 26.9 % — LOW (ref 34.5–45)
HGB BLD-MCNC: 9.5 G/DL — LOW (ref 11.5–15.5)
LYMPHOCYTES # BLD AUTO: 1.1 K/UL — SIGNIFICANT CHANGE UP (ref 1–3.3)
LYMPHOCYTES # BLD AUTO: 21.9 % — SIGNIFICANT CHANGE UP (ref 13–44)
MCHC RBC-ENTMCNC: 33.3 PG — SIGNIFICANT CHANGE UP (ref 27–34)
MCHC RBC-ENTMCNC: 35.2 G/DL — SIGNIFICANT CHANGE UP (ref 32–36)
MCV RBC AUTO: 94.5 FL — SIGNIFICANT CHANGE UP (ref 80–100)
MONOCYTES # BLD AUTO: 0.7 K/UL — SIGNIFICANT CHANGE UP (ref 0–0.9)
MONOCYTES NFR BLD AUTO: 13.3 % — SIGNIFICANT CHANGE UP (ref 2–14)
NEUTROPHILS # BLD AUTO: 3.3 K/UL — SIGNIFICANT CHANGE UP (ref 1.8–7.4)
NEUTROPHILS NFR BLD AUTO: 62.6 % — SIGNIFICANT CHANGE UP (ref 43–77)
PLATELET # BLD AUTO: 278 K/UL — SIGNIFICANT CHANGE UP (ref 150–400)
RBC # BLD: 2.85 M/UL — LOW (ref 3.8–5.2)
RBC # FLD: 15.5 % — HIGH (ref 10.3–14.5)
RH IG SCN BLD-IMP: POSITIVE — SIGNIFICANT CHANGE UP
WBC # BLD: 5.2 K/UL — SIGNIFICANT CHANGE UP (ref 3.8–10.5)
WBC # FLD AUTO: 5.2 K/UL — SIGNIFICANT CHANGE UP (ref 3.8–10.5)

## 2019-02-01 PROCEDURE — 99215 OFFICE O/P EST HI 40 MIN: CPT

## 2019-02-01 PROCEDURE — 86901 BLOOD TYPING SEROLOGIC RH(D): CPT

## 2019-02-01 PROCEDURE — 86900 BLOOD TYPING SEROLOGIC ABO: CPT

## 2019-02-01 PROCEDURE — 86850 RBC ANTIBODY SCREEN: CPT

## 2019-02-04 LAB
ALBUMIN SERPL ELPH-MCNC: 4.2 G/DL
ALP BLD-CCNC: 83 U/L
ALT SERPL-CCNC: 23 U/L
ANION GAP SERPL CALC-SCNC: 13 MMOL/L
AST SERPL-CCNC: 22 U/L
BILIRUB SERPL-MCNC: 0.3 MG/DL
BUN SERPL-MCNC: 24 MG/DL
CALCIUM SERPL-MCNC: 9.6 MG/DL
CHLORIDE SERPL-SCNC: 96 MMOL/L
CO2 SERPL-SCNC: 26 MMOL/L
CREAT SERPL-MCNC: 0.95 MG/DL
FERRITIN SERPL-MCNC: 493 NG/ML
GLUCOSE SERPL-MCNC: 233 MG/DL
IRON SATN MFR SERPL: 19 %
IRON SERPL-MCNC: 50 UG/DL
POTASSIUM SERPL-SCNC: 5 MMOL/L
PROT SERPL-MCNC: 6.8 G/DL
SODIUM SERPL-SCNC: 135 MMOL/L
TIBC SERPL-MCNC: 258 UG/DL
UIBC SERPL-MCNC: 208 UG/DL

## 2019-02-12 ENCOUNTER — APPOINTMENT (OUTPATIENT)
Dept: RADIATION ONCOLOGY | Facility: CLINIC | Age: 84
End: 2019-02-12

## 2019-02-14 ENCOUNTER — APPOINTMENT (OUTPATIENT)
Dept: GERIATRICS | Facility: CLINIC | Age: 84
End: 2019-02-14
Payer: MEDICARE

## 2019-02-14 VITALS
TEMPERATURE: 98.1 F | OXYGEN SATURATION: 97 % | RESPIRATION RATE: 16 BRPM | HEART RATE: 111 BPM | WEIGHT: 112.43 LBS | BODY MASS INDEX: 19.67 KG/M2

## 2019-02-14 DIAGNOSIS — Z51.5 ENCOUNTER FOR PALLIATIVE CARE: ICD-10-CM

## 2019-02-14 DIAGNOSIS — R63.0 ANOREXIA: ICD-10-CM

## 2019-02-14 PROCEDURE — 99204 OFFICE O/P NEW MOD 45 MIN: CPT

## 2019-02-20 ENCOUNTER — NON-APPOINTMENT (OUTPATIENT)
Age: 84
End: 2019-02-20

## 2019-02-20 ENCOUNTER — APPOINTMENT (OUTPATIENT)
Dept: PULMONOLOGY | Facility: CLINIC | Age: 84
End: 2019-02-20
Payer: MEDICARE

## 2019-02-20 VITALS
DIASTOLIC BLOOD PRESSURE: 70 MMHG | WEIGHT: 113 LBS | HEIGHT: 62 IN | BODY MASS INDEX: 20.8 KG/M2 | RESPIRATION RATE: 17 BRPM | HEART RATE: 123 BPM | SYSTOLIC BLOOD PRESSURE: 110 MMHG | OXYGEN SATURATION: 97 %

## 2019-02-20 DIAGNOSIS — Z82.3 FAMILY HISTORY OF STROKE: ICD-10-CM

## 2019-02-20 PROCEDURE — 94618 PULMONARY STRESS TESTING: CPT

## 2019-02-20 PROCEDURE — 94010 BREATHING CAPACITY TEST: CPT

## 2019-02-20 PROCEDURE — 99204 OFFICE O/P NEW MOD 45 MIN: CPT | Mod: 25

## 2019-02-20 PROCEDURE — 71046 X-RAY EXAM CHEST 2 VIEWS: CPT

## 2019-02-20 NOTE — ADDENDUM
[FreeTextEntry1] : Documented by Reggie Mccarthy acting as a scribe for Dr. Anshul Mann on 2/20/2019\par \par All medical record entries made by the Scribe were at my, Dr. Anshul Mann's, direction and personally dictated by me on 2/20/2019. I have reviewed the chart and agree that the record accurately reflects my personal performance of the history, physical exam, assessment and plan. I have also personally directed, reviewed, and agree with the discharge instructions. \par \par \par \par \par

## 2019-02-20 NOTE — PHYSICAL EXAM
[General Appearance - Well Developed] : well developed [Normal Appearance] : normal appearance [Well Groomed] : well groomed [General Appearance - Well Nourished] : well nourished [No Deformities] : no deformities [General Appearance - In No Acute Distress] : no acute distress [Normal Conjunctiva] : the conjunctiva exhibited no abnormalities [Eyelids - No Xanthelasma] : the eyelids demonstrated no xanthelasmas [Normal Oropharynx] : normal oropharynx [Neck Appearance] : the appearance of the neck was normal [Neck Cervical Mass (___cm)] : no neck mass was observed [Jugular Venous Distention Increased] : there was no jugular-venous distention [Thyroid Diffuse Enlargement] : the thyroid was not enlarged [Thyroid Nodule] : there were no palpable thyroid nodules [Respiration, Rhythm And Depth] : normal respiratory rhythm and effort [Exaggerated Use Of Accessory Muscles For Inspiration] : no accessory muscle use [Auscultation Breath Sounds / Voice Sounds] : lungs were clear to auscultation bilaterally [Abdomen Soft] : soft [Abdomen Tenderness] : non-tender [Abdomen Mass (___ Cm)] : no abdominal mass palpated [Abnormal Walk] : normal gait [Gait - Sufficient For Exercise Testing] : the gait was sufficient for exercise testing [Nail Clubbing] : no clubbing of the fingernails [Cyanosis, Localized] : no localized cyanosis [Petechial Hemorrhages (___cm)] : no petechial hemorrhages [Skin Color & Pigmentation] : normal skin color and pigmentation [Skin Turgor] : normal skin turgor [] : no rash [Deep Tendon Reflexes (DTR)] : deep tendon reflexes were 2+ and symmetric [Sensation] : the sensory exam was normal to light touch and pinprick [No Focal Deficits] : no focal deficits [Oriented To Time, Place, And Person] : oriented to person, place, and time [Impaired Insight] : insight and judgment were intact [Affect] : the affect was normal [FreeTextEntry1] : I:E 1:3, clear

## 2019-02-20 NOTE — PROCEDURE
[FreeTextEntry1] : CXR revealed a normal sized heart; elevated left hemidiaphragm; there was no evidence of infiltrate or effusion\par \par Patient completed a 6-minute walk/exercise study in which the Lowest Pulse Ox was 97%; there was no evidence of dyspnea. The patient walked 4 laps or 65.2 meters. The pt stopped during the test due to severe fatigue. \par \par PFT- spi reveals moderate restrictive dysfunction; FEV1 was 0.86L which is 50% of predicted; normal flow volume loop \par

## 2019-02-20 NOTE — ASSESSMENT
[FreeTextEntry1] : Ms. Cabral is an 83 year old female with no prior medical history. Following a fall in July 2018, she had a CT scan of the chest performed, revealing left upper lung nodule with a large hilar lymph node. She was subsequently referred for a PET/CT scan, which revealed hypermetabolic left upper lobe 2.2 cm lung nodule with suspicion for b/l hilar lymph node involvement as well as mediastinal involvement. A work-up revealed adenocarcinoma c/w lung primary. Mediastinal and left-sided lymph node sampling was negative. She underwent a VATS left upper lobectomy and mediastinal and hilar lymphadenectomy. Pathology revealed a stage IIIA adenocarcinoma based on mediastinal lymph node involvement. Tumor contains an activating EGFR mutation. She presents to the office today s/p Adjuvant chemotherapy with Carboplatin and Pemetrexed (11/20/2018- 1/22/2019) for a pulmonary evaluation.\par \par DDx Cough\par - RADS\par - Silent Reflux/ GERD (elevated hemidiaphragm)\par \par The patient's SOB is felt to be multifactorial:\par - Debility \par - Out of Shape\par - Poor breathing mechanics\par - Restrictive Dysfunction Secondary to Left Upper Lobectomy\par - RADS\par \par Problem 1: Lung Cancer Stage 3A with End Two Lymph Node Involvement: EGFR and PDL-1 Positive \par - S/p four cycles of Chemotherapy\par - Recommended radiation consultation with Dr. Presley Burrell \par \par Problem 2: RADS \par -  Add Breo Ellipta 200 1 inhalation QD \par -Inhaler technique reviewed as well as oral hygiene techniques reviewed with patient. Avoidance of cold air, extremes of temperature, rescue inhaler should be used before exercise. Order of medication reviewed with patient. Recommended use of a cool mist humidifier in the bedroom. \par \par Problem 3: Silent Reflux/ GERD (Elevated Hemidiaphragm )\par - Add Zantac 300 mg QHS\par - Things to avoid including overeating, spicy foods, tight clothing, eating within three hours of bed, this list is not all inclusive. \par -For treatment of reflux, possible options discussed including diet control, H2 blockers, PPIs, as well as coating motility agents discussed as treatment options. Timing of meals and proximity of last meal to sleep were discussed. If symptoms persist, a formal gastrointestinal evaluation is needed.\par \par Problem 4: Poor Breathing Mechanics \par - Proper breathing techniques were reviewed with an emphasis of exhalation. Patient instructed to breath in for 1 second and out for four seconds. Patient was encouraged to not talk while walking.\par \par Problem 5: Health maintenance \par -s/p flu shot \par -recommended strep pneumonia vaccines: Prevnar-13 vaccine, followed by Pneumo vaccine 23 one year following\par -recommended early intervention for URIs\par -recommended regular osteoporosis evaluations\par -recommended early dermatological evaluations\par -recommended after the age of 50 to the age of 70, colonoscopy every 5 years\par \par \par f/u in 6-8 weeks\par pt is encouraged to call or fax the office with any questions or concerns. \par Explained to the pt in full detail with demonstrations how to use the inhalers and inhaler hygiene. \par -Education provided to the PT regarding their visit and conditions.

## 2019-02-20 NOTE — HISTORY OF PRESENT ILLNESS
[FreeTextEntry1] : Ms. Cabral is an 83 year old female coming into the office today for an initial evaluation. Her chief complaint is\par - She recently had a VATS left upper lobectomy \par - She comes in statin that she does not feel normal\par - She denies any dysphagia though she does not have a desire to swallow. Her appetite has been reduced. \par - Certain odors have bothered her\par - She has lost some weight\par - She has been coughing a lot but has recently improved slightly. \par - While she denies PND, she has some sputum that has been difficult to bring up. When she does bring it up with coughing, the sputum is very thick\par - She sleeps well\par - She does not snore\par - She sometimes has some severe left shoulder pain that was more prominent following her surgery. \par - She denies any headaches, nausea, vomiting, fever, chills, sweats, chest pain, chest pressure, palpitations, SOB, wheezing, fatigue, diarrhea, constipation, dysphagia, myalgias, dizziness, leg swelling, leg pain, itchy eyes, itchy ears, heartburn, reflux, or sour taste in the mouth.

## 2019-02-21 ENCOUNTER — APPOINTMENT (OUTPATIENT)
Dept: HEMATOLOGY ONCOLOGY | Facility: CLINIC | Age: 84
End: 2019-02-21

## 2019-02-25 PROBLEM — R63.0 APPETITE LOSS: Status: ACTIVE | Noted: 2019-02-25

## 2019-02-25 PROBLEM — Z51.5 ENCOUNTER FOR PALLIATIVE CARE: Status: ACTIVE | Noted: 2019-02-25

## 2019-02-25 NOTE — HISTORY OF PRESENT ILLNESS
[FreeTextEntry1] : 83yoF with Stage III lung adenocarcinoma presents for initial palliative care visit, referred by Dr. Francisco. \par \par Patient diagnosed 10/2018, is s/p VIKASH resection. Began adjuvant chemotherapy with carbo/pemetrexed on 11/20/18. \par \par Main issue today is loss of appetite that has been progressively worsening. Pt has little desire to eat, has sensitivity to even the smell of food. Drinks 1-2 Ensures daily. \par \par ROS:\par +10lb weight loss since diagnosis\par +cough - sometimes productive of thick phlegm. sometimes has post-tussive emesis. Tessalon did not help, has been using robitussin. \par +weakness\par \par Patient is , lives with her  and son.  She has three children in total. Two daughters lives nearby. \par \par [employer]-Stop Ref#: [id number]

## 2019-02-25 NOTE — PHYSICAL EXAM
[General Appearance - Alert] : alert [General Appearance - In No Acute Distress] : in no acute distress [Sclera] : the sclera and conjunctiva were normal [Normal Oral Mucosa] : normal oral mucosa [Neck Appearance] : the appearance of the neck was normal [] : no respiratory distress [Auscultation Breath Sounds / Voice Sounds] : lungs were clear to auscultation bilaterally [Heart Rate And Rhythm] : heart rate was normal and rhythm regular [Heart Sounds] : normal S1 and S2 [Edema] : there was no peripheral edema [Bowel Sounds] : normal bowel sounds [Abdomen Soft] : soft [Abdomen Tenderness] : non-tender [Skin Color & Pigmentation] : normal skin color and pigmentation [No Focal Deficits] : no focal deficits [Oriented To Time, Place, And Person] : oriented to person, place, and time [Affect] : the affect was normal

## 2019-02-25 NOTE — ASSESSMENT
[FreeTextEntry1] : 83yoF with:\par \par 1. Lung adenocarcinoma - On adjuvant Carboplatin/Pemetrexed.\par \par 2. Appetite loss - Medical cannabis certification completed today.  Provided cannabis education, overview of state program, and discussed adverse effects in detail. Recommend starting with 1:1 THC:CBD formulation at low dose of THC (~2mg/dose).\par \par 3. Cough - c/w tessalon at night, robitussin during day. Establishing care with Pulm next week. \par \par 4. Encounter for Palliative Care - Emotional support provided. Will complete ACP documents at follow up visit. \par \par RTO 1 month

## 2019-03-12 ENCOUNTER — APPOINTMENT (OUTPATIENT)
Dept: SURGERY | Facility: CLINIC | Age: 84
End: 2019-03-12
Payer: MEDICARE

## 2019-03-12 DIAGNOSIS — E04.1 NONTOXIC SINGLE THYROID NODULE: ICD-10-CM

## 2019-03-12 PROCEDURE — 36415 COLL VENOUS BLD VENIPUNCTURE: CPT

## 2019-03-12 PROCEDURE — 99213 OFFICE O/P EST LOW 20 MIN: CPT

## 2019-03-12 NOTE — HISTORY OF PRESENT ILLNESS
[de-identified] : prior evaluation of subcentimeter thyroid nodule. since last visit has been treated for lung cancer with surgery and chemotherapy with continuous nausea.

## 2019-03-12 NOTE — PHYSICAL EXAM
[de-identified] : < 1 cm right thyroid nodule, well circumscribed and mobile [Midline] : located in midline position [Normal] : orientation to person, place, and time: normal [de-identified] : unable to perform laryngoscopy due to severe regurgitation

## 2019-03-13 LAB
T3 SERPL-MCNC: 103 NG/DL
T4 FREE SERPL-MCNC: 1.6 NG/DL
THYROGLOB AB SERPL-ACNC: 20.6 IU/ML
THYROPEROXIDASE AB SERPL IA-ACNC: 23.8 IU/ML
TSH SERPL-ACNC: 1.09 UIU/ML

## 2019-03-19 ENCOUNTER — OTHER (OUTPATIENT)
Age: 84
End: 2019-03-19

## 2019-06-14 ENCOUNTER — OUTPATIENT (OUTPATIENT)
Dept: OUTPATIENT SERVICES | Facility: HOSPITAL | Age: 84
LOS: 1 days | End: 2019-06-14
Payer: MEDICARE

## 2019-06-14 ENCOUNTER — APPOINTMENT (OUTPATIENT)
Dept: INFECTIOUS DISEASE | Facility: CLINIC | Age: 84
End: 2019-06-14
Payer: MEDICARE

## 2019-06-14 VITALS
BODY MASS INDEX: 19.94 KG/M2 | HEART RATE: 106 BPM | TEMPERATURE: 96.9 F | OXYGEN SATURATION: 94 % | SYSTOLIC BLOOD PRESSURE: 152 MMHG | WEIGHT: 109 LBS | DIASTOLIC BLOOD PRESSURE: 78 MMHG

## 2019-06-14 DIAGNOSIS — R76.12 NONSPECIFIC REACTION TO CELL MEDIATED IMMUNITY MEASUREMENT OF GAMMA INTERFERON ANTIGEN RESPONSE WITHOUT ACTIVE TUBERCULOSIS: ICD-10-CM

## 2019-06-14 DIAGNOSIS — N60.02 SOLITARY CYST OF LEFT BREAST: Chronic | ICD-10-CM

## 2019-06-14 DIAGNOSIS — B97.89 OTHER VIRAL AGENTS AS THE CAUSE OF DISEASES CLASSIFIED ELSEWHERE: ICD-10-CM

## 2019-06-14 PROCEDURE — G0463: CPT

## 2019-06-14 PROCEDURE — 99204 OFFICE O/P NEW MOD 45 MIN: CPT

## 2019-06-14 NOTE — PHYSICAL EXAM
[General Appearance - Alert] : alert [General Appearance - In No Acute Distress] : in no acute distress [FreeTextEntry1] : non-toxic, but weak appearing  [Sclera] : the sclera and conjunctiva were normal [Extraocular Movements] : extraocular movements were intact [PERRL With Normal Accommodation] : pupils were equal in size, round, reactive to light [Outer Ear] : the ears and nose were normal in appearance [Oropharynx] : the oropharynx was normal with no thrush [Neck Appearance] : the appearance of the neck was normal [Neck Cervical Mass (___cm)] : no neck mass was observed [Jugular Venous Distention Increased] : there was no jugular-venous distention [] : no respiratory distress [Auscultation Breath Sounds / Voice Sounds] : lungs were clear to auscultation bilaterally [Heart Rate And Rhythm] : heart rate was normal and rhythm regular [Heart Sounds] : normal S1 and S2 [Edema] : there was no peripheral edema [Bowel Sounds] : normal bowel sounds [Abdomen Soft] : soft [Cervical Lymph Nodes Enlarged Posterior Bilaterally] : posterior cervical [Cervical Lymph Nodes Enlarged Anterior Bilaterally] : anterior cervical [Supraclavicular Lymph Nodes Enlarged Bilaterally] : supraclavicular [Skin Lesions] : no skin lesions [No Focal Deficits] : no focal deficits [Oriented To Time, Place, And Person] : oriented to person, place, and time [Affect] : the affect was normal

## 2019-06-14 NOTE — REVIEW OF SYSTEMS
[Feeling Tired] : feeling tired [Recent Weight Loss (___ Lbs)] : recent [unfilled] ~Ulb weight loss [Cough] : cough [Sputum] : coughing up ~M sputum [Negative] : Neurological

## 2019-06-14 NOTE — HISTORY OF PRESENT ILLNESS
[FreeTextEntry1] : This is a 85 yo F with a recent dx of adenocarcinoma of the lung s/p VIKASH lobectomay via VATS.  Found incidentally after a fall.  S/p chemo (no radiation) for this. Last chemo 1/22/19.\par \par Presents with daughter.  \par Has been recovering from surgery and chemo. Was very weak and lost about 13 lbs with this illness. \par \par Pt's granddaughter works in a hospital and had TB testing and her PPD was positive. This prompted the family to be tested.\par \par Both patient and her daughter were also positive.\par \par The patient's last lung imaging was 12/2018 (xray) which did not have any evidence of TB.  \par 12/3/18: No acute infiltrates or effusions.  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 Sometimes makes sputum. Denies sputum. Gained some weight back. Denies hemoptysis, LAD, fevers, night sweats, chills.  \par \par Born in [place]. [place]oved [place] ago. Never been back. Her mother was  GYN in [place].  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 \par \par She lives with her  and son now.\par \par Does not smoke, drink alcohol, or  use any drugs. \par She was exposed to 2nd hand smoke from her mother.  \par \par Quantiferon gold positive 5/20/19\par 12/3/18 chest xray negative\par \par Daughter feels pt is too weak now to tolerate treatment for latent TB.   She is just starting to get her appetite back. \par \par

## 2019-06-14 NOTE — ASSESSMENT
[FreeTextEntry1] : This is a pleasant 83 yo F with prior lung ca s/p VIKASH lobectomy presents for positive quantiferon gold today.\par \par Last chest imaging 12/2018 which was clear and w/o findings of active TB.\par \par Advised that pt have repeat chest xray which she will do with her lung doctor at 7/2/19 appointment.\par \par I advised that if she can tolerate the treatment for latent TB, ideally I would treat for latent TB.  There is risk of reactivation, especially if she were to need more immunosuppression in the future.\par \par Currently, the patient feels too weak to attempt treatment. Appetite is just returning and she would like to hold off on treatment.  If pt ultimately does not want treatment for latent TB, she will need to be monitored clinically for reactivation of TB.\par \par For now will plan to get repeat chest xray.\par Pt advised if she wants treatment, xray has to be w/in 3 months of starting treatment.\par \par Side effects of medication include, hepatotoxitiy, bone marrow suppression, GI upset, orange colored urine, peripheral neuropathy.  There is typically less hepatotoxicity with rifampin compared to INH so if we decide to treat, I may try rifampin 600 mg po daily. She is not on any other medications, so no need to worry about drug interactions. \par \par Daughter and pt expressed understanding.  \par \par Will return in few months.\par \par

## 2019-06-14 NOTE — CONSULT LETTER
[Dear  ___] : Dear  [unfilled], [Consult Letter:] : I had the pleasure of evaluating your patient, [unfilled]. [Please see my note below.] : Please see my note below. [Consult Closing:] : Thank you very much for allowing me to participate in the care of this patient.  If you have any questions, please do not hesitate to contact me. [Sincerely,] : Sincerely, [FreeTextEntry2] : Dr. Carlos Alberto Andrews\par [address] [facility]\par [address]\par [place] [id number] [DrJaida  ___] : Dr. CONWAY [FreeTextEntry3] : \par Stephania Lackey MD\par  of Medicine\par Division of Infectious Diseases\par [facility]\par [address] [place] [address] DrJaida\[place] [address]\par Tel: [phone]\par Fax: [phone]\par \par \par  [DrJaida ___] : Dr. CONWAY

## 2019-07-02 ENCOUNTER — APPOINTMENT (OUTPATIENT)
Dept: PULMONOLOGY | Facility: CLINIC | Age: 84
End: 2019-07-02
Payer: MEDICARE

## 2019-07-02 ENCOUNTER — NON-APPOINTMENT (OUTPATIENT)
Age: 84
End: 2019-07-02

## 2019-07-02 VITALS
DIASTOLIC BLOOD PRESSURE: 70 MMHG | HEIGHT: 62 IN | OXYGEN SATURATION: 97 % | BODY MASS INDEX: 20.61 KG/M2 | WEIGHT: 112 LBS | RESPIRATION RATE: 17 BRPM | SYSTOLIC BLOOD PRESSURE: 136 MMHG | HEART RATE: 93 BPM

## 2019-07-02 PROCEDURE — 71046 X-RAY EXAM CHEST 2 VIEWS: CPT

## 2019-07-02 PROCEDURE — 94010 BREATHING CAPACITY TEST: CPT

## 2019-07-02 PROCEDURE — 99214 OFFICE O/P EST MOD 30 MIN: CPT | Mod: 25

## 2019-07-02 NOTE — PROCEDURE
[FreeTextEntry1] : PFT revealed mild restrictive and mild obstructive dysfunction, with a FEV1 of 1.11L, which is 66% of predicted, with a normal flow volume loop \par \par CXR reveals a normal sized heart; s/p left lung surgery with elevated left hemidiaphragm, volume loss on left side, right side is clear

## 2019-07-02 NOTE — ADDENDUM
[FreeTextEntry1] : Documented by Kris Vivar acting as a scribe for Dr. Anshul Mann on 07/02/2019.\par \par All medical record entries made by the Scribe were at my, Dr. Anshul Mann's, direction and personally dictated by me on 07/02/2019. I have reviewed the chart and agree that the record accurately reflects my personal performance of the history, physical exam, assessment and plan. I have also personally directed, reviewed, and agree with the discharge instructions. \par \par \par

## 2019-07-02 NOTE — PHYSICAL EXAM
[General Appearance - Well Developed] : well developed [Well Groomed] : well groomed [Normal Appearance] : normal appearance [General Appearance - Well Nourished] : well nourished [No Deformities] : no deformities [General Appearance - In No Acute Distress] : no acute distress [Normal Conjunctiva] : the conjunctiva exhibited no abnormalities [Normal Oropharynx] : normal oropharynx [Eyelids - No Xanthelasma] : the eyelids demonstrated no xanthelasmas [Neck Cervical Mass (___cm)] : no neck mass was observed [Neck Appearance] : the appearance of the neck was normal [Jugular Venous Distention Increased] : there was no jugular-venous distention [Thyroid Nodule] : there were no palpable thyroid nodules [Thyroid Diffuse Enlargement] : the thyroid was not enlarged [Respiration, Rhythm And Depth] : normal respiratory rhythm and effort [Exaggerated Use Of Accessory Muscles For Inspiration] : no accessory muscle use [Auscultation Breath Sounds / Voice Sounds] : lungs were clear to auscultation bilaterally [Abdomen Soft] : soft [Abdomen Tenderness] : non-tender [Abdomen Mass (___ Cm)] : no abdominal mass palpated [Gait - Sufficient For Exercise Testing] : the gait was sufficient for exercise testing [Abnormal Walk] : normal gait [Nail Clubbing] : no clubbing of the fingernails [Cyanosis, Localized] : no localized cyanosis [Petechial Hemorrhages (___cm)] : no petechial hemorrhages [Sensation] : the sensory exam was normal to light touch and pinprick [No Focal Deficits] : no focal deficits [Deep Tendon Reflexes (DTR)] : deep tendon reflexes were 2+ and symmetric [Impaired Insight] : insight and judgment were intact [Oriented To Time, Place, And Person] : oriented to person, place, and time [Affect] : the affect was normal [Skin Turgor] : normal skin turgor [II] : II [Kyphosis] : kyphosis [] : no rash [Skin Color & Pigmentation] : normal skin color and pigmentation [No Skin Ulcers] : no skin ulcer [Skin Lesions] : no skin lesions [No Venous Stasis] : no venous stasis [No Xanthoma] : no  xanthoma was observed [FreeTextEntry1] : mild kyphosis

## 2019-07-02 NOTE — REASON FOR VISIT
[Family Member] : family member [Follow-Up] : a follow-up visit [FreeTextEntry1] : Chronic cough, (+) for Quantifuron Gold, Stage 3A Lung cancer, RADS, GERD

## 2019-07-02 NOTE — HISTORY OF PRESENT ILLNESS
[FreeTextEntry1] : Ms. Cabral is an 83 year old female coming into the office today for a follow up visit. Her chief complaint is weak legs.\par -her daughter notes she is feeling better\par -she notes her legs are feeling weaker, and has started physical therapy about 2-3 times\par -she notes having an elevated heart rate while walking\par -she reports she is sleeping well, with 8-9 hours of sleep nightly and takes an hour nap in the afternoon. Her  daughter notes she rarely and slightly snores\par -she notes her cough has improved greatly\par -she reports eating well, and her appetite has greatly improved\par -her daughter notes she drinks a total of about 4 cups of ensure, cranberry juice, and orange daily\par -she notes only having a sour taste in her mouth only if she eats or drinks something sour\par -she notes she has dysphonia that improves only when eating ice cream\par -she denies having neuropathy in her feet, but notes her legs are weak\par -she denies any chest pain, chest pressure, diarrhea, constipation, dysphagia, dizziness, leg swelling, leg pain, heartburn, reflux, sour taste in the mouth

## 2019-07-02 NOTE — ASSESSMENT
[FreeTextEntry1] : Ms. Cabral is an 83 year old female with no prior medical history. Following a fall in July 2018, she had a CT scan of the chest performed, revealing left upper lung nodule with a large hilar lymph node. She was subsequently referred for a PET/CT scan, which revealed hypermetabolic left upper lobe 2.2 cm lung nodule with suspicion for b/l hilar lymph node involvement as well as mediastinal involvement. A work-up revealed adenocarcinoma c/w lung primary. Mediastinal and left-sided lymph node sampling was negative. She underwent a VATS left upper lobectomy and mediastinal and hilar lymphadenectomy. Pathology revealed a stage IIIA adenocarcinoma based on mediastinal lymph node involvement. Tumor contains an activating EGFR mutation. She presents to the office today s/p Adjuvant chemotherapy with Carboplatin and Pemetrexed (11/20/2018- 1/22/2019) for a follow up pulmonary re-evaluation. She is stable from a pulmonary perspective and is s/p a recent Quantiferon Gold.\par \par DDx Cough\par - RADS\par - Silent Reflux/ GERD (elevated hemidiaphragm)\par \par The patient's SOB is felt to be multifactorial:\par - Debility \par - Out of Shape\par - Poor breathing mechanics\par - Restrictive Dysfunction Secondary to Left Upper Lobectomy\par - RADS\par \par Problem 1: Lung Cancer Stage 3A with End Two Lymph Node Involvement: EGFR and PDL-1 Positive \par - S/p four cycles of Chemotherapy\par - Recommended radiation consultation with Dr. Presley Burrell \par -Complete CTs as per oncologist\par \par Problem 2: RADS \par -  continue Breo Ellipta 200 1 inhalation QD \par -Inhaler technique reviewed as well as oral hygiene techniques reviewed with patient. Avoidance of cold air, extremes of temperature, rescue inhaler should be used before exercise. Order of medication reviewed with patient. Recommended use of a cool mist humidifier in the bedroom. \par \par Problem 3: Silent Reflux/ GERD (Elevated Hemidiaphragm )\par - continue Zantac 300 mg QHS\par - Things to avoid including overeating, spicy foods, tight clothing, eating within three hours of bed, this list is not all inclusive. \par -For treatment of reflux, possible options discussed including diet control, H2 blockers, PPIs, as well as coating motility agents discussed as treatment options. Timing of meals and proximity of last meal to sleep were discussed. If symptoms persist, a formal gastrointestinal evaluation is needed.\par \par Problem 4: Poor Breathing Mechanics \par - Proper breathing techniques were reviewed with an emphasis of exhalation. Patient instructed to breath in for 1 second and out for four seconds. Patient was encouraged to not talk while walking.\par \par Problem 5: Positive Quantiferon Gold\par -Currently under observation; family hold on Rx\par \par Problem 6: Health maintenance \par -Recommended to use [place] or [place] for her voice\par -s/p flu shot \par -recommended strep pneumonia vaccines: Prevnar-13 vaccine, followed by Pneumo vaccine 23 one year following\par -recommended early intervention for URIs\par -recommended regular osteoporosis evaluations\par -recommended early dermatological evaluations\par -recommended after the age of 50 to the age of 70, colonoscopy every 5 years\par \par f/u in 3 months with full PFTs\par pt is encouraged to call or fax the office with any questions or concerns. \par Explained to the pt in full detail with demonstrations how to use the inhalers and inhaler hygiene. \par -Education provided to the PT regarding their visit and conditions.

## 2019-08-29 ENCOUNTER — EMERGENCY (EMERGENCY)
Facility: HOSPITAL | Age: 84
LOS: 1 days | Discharge: HOME | End: 2019-08-29
Attending: EMERGENCY MEDICINE
Payer: MEDICARE

## 2019-08-29 ENCOUNTER — APPOINTMENT (OUTPATIENT)
Dept: OPHTHALMOLOGY | Facility: CLINIC | Age: 84
End: 2019-08-29

## 2019-08-29 VITALS
OXYGEN SATURATION: 98 % | DIASTOLIC BLOOD PRESSURE: 81 MMHG | TEMPERATURE: 98 F | SYSTOLIC BLOOD PRESSURE: 136 MMHG | HEIGHT: 63 IN | RESPIRATION RATE: 16 BRPM | WEIGHT: 111.99 LBS | HEART RATE: 108 BPM

## 2019-08-29 VITALS — RESPIRATION RATE: 16 BRPM | HEART RATE: 104 BPM | OXYGEN SATURATION: 97 %

## 2019-08-29 DIAGNOSIS — N60.02 SOLITARY CYST OF LEFT BREAST: Chronic | ICD-10-CM

## 2019-08-29 LAB
ALBUMIN SERPL ELPH-MCNC: 3.9 G/DL — SIGNIFICANT CHANGE UP (ref 3.3–5)
ALP SERPL-CCNC: 66 U/L — SIGNIFICANT CHANGE UP (ref 40–120)
ALT FLD-CCNC: 41 U/L — SIGNIFICANT CHANGE UP (ref 10–45)
ANION GAP SERPL CALC-SCNC: 14 MMOL/L — SIGNIFICANT CHANGE UP (ref 5–17)
AST SERPL-CCNC: 37 U/L — SIGNIFICANT CHANGE UP (ref 10–40)
BASOPHILS # BLD AUTO: 0 K/UL — SIGNIFICANT CHANGE UP (ref 0–0.2)
BASOPHILS NFR BLD AUTO: 0.2 % — SIGNIFICANT CHANGE UP (ref 0–2)
BILIRUB SERPL-MCNC: 0.2 MG/DL — SIGNIFICANT CHANGE UP (ref 0.2–1.2)
BUN SERPL-MCNC: 34 MG/DL — HIGH (ref 7–23)
CALCIUM SERPL-MCNC: 9.8 MG/DL — SIGNIFICANT CHANGE UP (ref 8.4–10.5)
CHLORIDE SERPL-SCNC: 101 MMOL/L — SIGNIFICANT CHANGE UP (ref 96–108)
CO2 SERPL-SCNC: 20 MMOL/L — LOW (ref 22–31)
CREAT SERPL-MCNC: 0.81 MG/DL — SIGNIFICANT CHANGE UP (ref 0.5–1.3)
EOSINOPHIL # BLD AUTO: 0.1 K/UL — SIGNIFICANT CHANGE UP (ref 0–0.5)
EOSINOPHIL NFR BLD AUTO: 0.9 % — SIGNIFICANT CHANGE UP (ref 0–6)
GLUCOSE SERPL-MCNC: 168 MG/DL — HIGH (ref 70–99)
HCT VFR BLD CALC: 36.6 % — SIGNIFICANT CHANGE UP (ref 34.5–45)
HGB BLD-MCNC: 12.4 G/DL — SIGNIFICANT CHANGE UP (ref 11.5–15.5)
LYMPHOCYTES # BLD AUTO: 1.6 K/UL — SIGNIFICANT CHANGE UP (ref 1–3.3)
LYMPHOCYTES # BLD AUTO: 20.1 % — SIGNIFICANT CHANGE UP (ref 13–44)
MCHC RBC-ENTMCNC: 31.8 PG — SIGNIFICANT CHANGE UP (ref 27–34)
MCHC RBC-ENTMCNC: 34 GM/DL — SIGNIFICANT CHANGE UP (ref 32–36)
MCV RBC AUTO: 93.7 FL — SIGNIFICANT CHANGE UP (ref 80–100)
MONOCYTES # BLD AUTO: 0.5 K/UL — SIGNIFICANT CHANGE UP (ref 0–0.9)
MONOCYTES NFR BLD AUTO: 5.8 % — SIGNIFICANT CHANGE UP (ref 2–14)
NEUTROPHILS # BLD AUTO: 5.8 K/UL — SIGNIFICANT CHANGE UP (ref 1.8–7.4)
NEUTROPHILS NFR BLD AUTO: 73 % — SIGNIFICANT CHANGE UP (ref 43–77)
PLATELET # BLD AUTO: 226 K/UL — SIGNIFICANT CHANGE UP (ref 150–400)
POTASSIUM SERPL-MCNC: 5.1 MMOL/L — SIGNIFICANT CHANGE UP (ref 3.5–5.3)
POTASSIUM SERPL-SCNC: 5.1 MMOL/L — SIGNIFICANT CHANGE UP (ref 3.5–5.3)
PROT SERPL-MCNC: 7 G/DL — SIGNIFICANT CHANGE UP (ref 6–8.3)
RBC # BLD: 3.9 M/UL — SIGNIFICANT CHANGE UP (ref 3.8–5.2)
RBC # FLD: 12.2 % — SIGNIFICANT CHANGE UP (ref 10.3–14.5)
SODIUM SERPL-SCNC: 135 MMOL/L — SIGNIFICANT CHANGE UP (ref 135–145)
WBC # BLD: 7.9 K/UL — SIGNIFICANT CHANGE UP (ref 3.8–10.5)
WBC # FLD AUTO: 7.9 K/UL — SIGNIFICANT CHANGE UP (ref 3.8–10.5)

## 2019-08-29 PROCEDURE — 70486 CT MAXILLOFACIAL W/O DYE: CPT | Mod: 26

## 2019-08-29 PROCEDURE — 70450 CT HEAD/BRAIN W/O DYE: CPT

## 2019-08-29 PROCEDURE — 99284 EMERGENCY DEPT VISIT MOD MDM: CPT | Mod: 25,GC

## 2019-08-29 PROCEDURE — 72125 CT NECK SPINE W/O DYE: CPT

## 2019-08-29 PROCEDURE — 76377 3D RENDER W/INTRP POSTPROCES: CPT | Mod: 26

## 2019-08-29 PROCEDURE — 85027 COMPLETE CBC AUTOMATED: CPT

## 2019-08-29 PROCEDURE — 70450 CT HEAD/BRAIN W/O DYE: CPT | Mod: 26

## 2019-08-29 PROCEDURE — 71045 X-RAY EXAM CHEST 1 VIEW: CPT

## 2019-08-29 PROCEDURE — 72125 CT NECK SPINE W/O DYE: CPT | Mod: 26

## 2019-08-29 PROCEDURE — 12011 RPR F/E/E/N/L/M 2.5 CM/<: CPT

## 2019-08-29 PROCEDURE — 70486 CT MAXILLOFACIAL W/O DYE: CPT

## 2019-08-29 PROCEDURE — 80053 COMPREHEN METABOLIC PANEL: CPT

## 2019-08-29 PROCEDURE — 99284 EMERGENCY DEPT VISIT MOD MDM: CPT | Mod: 25

## 2019-08-29 PROCEDURE — 76377 3D RENDER W/INTRP POSTPROCES: CPT

## 2019-08-29 PROCEDURE — 71045 X-RAY EXAM CHEST 1 VIEW: CPT | Mod: 26

## 2019-08-29 RX ORDER — ACETAMINOPHEN 500 MG
500 TABLET ORAL ONCE
Refills: 0 | Status: COMPLETED | OUTPATIENT
Start: 2019-08-29 | End: 2019-08-29

## 2019-08-29 RX ORDER — ACETAMINOPHEN 500 MG
325 TABLET ORAL ONCE
Refills: 0 | Status: COMPLETED | OUTPATIENT
Start: 2019-08-29 | End: 2019-08-29

## 2019-08-29 RX ADMIN — Medication 500 MILLIGRAM(S): at 05:28

## 2019-08-29 RX ADMIN — Medication 500 MILLIGRAM(S): at 06:18

## 2019-08-29 RX ADMIN — Medication 1 TABLET(S): at 08:01

## 2019-08-29 NOTE — ED PROVIDER NOTE - NSFOLLOWUPINSTRUCTIONS_ED_ALL_ED_FT
1) You were seen in the ER for complex facial fractures. The patient has been informed of all concerning signs and symptoms to return to Emergency Department, the necessity to follow up with PMD/Clinic/follow up provided within 2-3 days was explained, and the patient reports understanding of above with capacity and insight. You can look at the discharge papers for some examples of specific signs and symptoms to look out for.  2) Please follow up with Dr. Childress next week, [phone].  Please call today to set-up your appointment.  2) Please follow up with Opthalmology within 1 week.   3) Take  Antibiotics Augmentin as prescribed  4) no nose blowing   5) You can use nasal decongestants for 3 days to limit nose blowing  6) Ice packs to the right eyelid for 20 minutes every 2 hours for first 48 hours after trauma

## 2019-08-29 NOTE — ED PROVIDER NOTE - CLINICAL SUMMARY MEDICAL DECISION MAKING FREE TEXT BOX
84F no a/c p/w mechanical fall, periorbital ecchymosis on right. Chin lac pt refusing sutures, will dermabond and steristrip.  Labs, pain control. Took 500mg tylenol prior to arrival. Pt in NAD.

## 2019-08-29 NOTE — ED ADULT NURSE NOTE - MODE OF DISCHARGE
Pt states took 40 of the clonazepam and 20 of the lithium followed by ETOH     Zita Waters, RICHAR  04/10/17 0154     Wheelchair

## 2019-08-29 NOTE — ED PROVIDER NOTE - PATIENT PORTAL LINK FT
You can access the FollowMyHealth Patient Portal offered by [facility] by registering at the following website: [email or web address]. By joining [employer] [facility]’s FollowMyHealth portal, you will also be able to view your health information using other applications (apps) compatible with our system.

## 2019-08-29 NOTE — ED ADULT NURSE REASSESSMENT NOTE - NS ED NURSE REASSESS COMMENT FT1
as per MD Echeverria, collection of stat UA for pt not necessary if unable to obtain. safety maintained.

## 2019-08-29 NOTE — ED ADULT NURSE NOTE - NSIMPLEMENTINTERV_GEN_ALL_ED
Implemented All Fall Risk Interventions:  [place] to call system. Call bell, personal items and telephone within reach. Instruct patient to call for assistance. Room bathroom lighting operational. Non-slip footwear when patient is off stretcher. Physically safe environment: no spills, clutter or unnecessary equipment. Stretcher in lowest position, wheels locked, appropriate side rails in place. Provide visual cue, wrist band, yellow gown, etc. Monitor gait and stability. Monitor for mental status changes and reorient to person, place, and time. Review medications for side effects contributing to fall risk. Reinforce activity limits and safety measures with patient and family.

## 2019-08-29 NOTE — ED PROVIDER NOTE - NSFOLLOWUPCLINICS_GEN_ALL_ED_FT
[facility]  Ophthalmology  [address] [place] [address] [address]  [place] [address]  Phone: [phone]  Fax:   Follow Up Time:

## 2019-08-29 NOTE — CONSULT NOTE ADULT - SUBJECTIVE AND OBJECTIVE BOX
full note to follow Plastic Surgery Consult Note  (pg [facility]: [id number], [facility]: [phone])    HPI:    83 y/o F presented to ED c/o facial pain and ecchymosis s/p mechanical fall yesterday evening. Patients daughter at bedside explains that patient was trying to turn on a light in the home, tripped and fell forward hitting her face on the ground. She denies any LOC/Syncope or pre fall symptoms. Patient now c/o R sided facial pain, with trismus and benito orbital ecchymosis as well as small laceration to chin. She denies any changes in vision, double vision or neck pain. Plastic surgery consulted for R sided ZMC fracture noted on CT max/face. Per daughter, she had a history of similar fall last year with a fracture to maxilla and no operative course/interventions occurred. Patient and daughter unsure at this time if operative course would be best for patient given history of lung CA and chemotherapy which she is now just now obtaining her strength back from.     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  Mediastinal lymphadenopathy  Lung nodule  Benign breast cyst in female, left    Allergies    azithromycin (Other)    Intolerances      Home Medications:  dexamethasone 4 mg oral tablet: 1 tab(s) orally 2 times a day, DAY BEFORE and DAY AFTER CHEMO (22 Jan 2019 16:13)  folic acid 1 mg oral tablet: 1 tab(s) orally once a day (22 Jan 2019 16:13)  metoclopramide 10 mg oral tablet: 1 tab(s) orally 4 times a day (before meals and at bedtime) (22 Jan 2019 16:13)  Vitamin B12: every 6 weeks (22 Jan 2019 16:13)    MEDICATIONS  (STANDING):      SOCIAL HISTORY:  FAMILY HISTORY:  Family history of cerebrovascular accident (CVA) (Father)      ___________________________________________  OBJECTIVE:  Vital Signs Last 24 Hrs  T(C): 36.8 (29 Aug 2019 05:20), Max: 36.8 (29 Aug 2019 05:20)  T(F): 98.2 (29 Aug 2019 05:20), Max: 98.2 (29 Aug 2019 05:20)  HR: 104 (29 Aug 2019 08:00) (104 - 109)  BP: 136/81 (29 Aug 2019 07:05) (136/81 - 140/68)  BP(mean): --  RR: 16 (29 Aug 2019 08:00) (16 - 16)  SpO2: 97% (29 Aug 2019 08:00) (96% - 98%)CAPILLARY BLOOD GLUCOSE        I&O's Detail    General: NAD  Neuro: alert and oriented, no focal deficits, moves all extremities spontaneously  HEENT: Patient has periorbital ecchymosis surrounding R eye with mild depression and tenderness to palpation over R check/ZMC. No tenderness overlying nasal bone of frontal bone, EOMI, PERRL. No C spine tenderness, no subjective malocclusion, no septal hematoma, no hemotympanum. Patient c/o R sided trismus with tenderness to palpation over R mandible. No numbness/tingling, no paresthesia.    Respiratory: airway patent, respirations unlabored  CVS: regular rate and rhythm  Abdomen: soft, nontender, nondistended  Extremities: no edema, sensation and movement grossly intact  Skin: warm, dry, appropriate color  ____________________________________________  LABS:  CBC Full  -  ( 29 Aug 2019 02:45 )  WBC Count : 7.9 K/uL  RBC Count : 3.90 M/uL  Hemoglobin : 12.4 g/dL  Hematocrit : 36.6 %  Platelet Count - Automated : 226 K/uL  Mean Cell Volume : 93.7 fl  Mean Cell Hemoglobin : 31.8 pg  Mean Cell Hemoglobin Concentration : 34.0 gm/dL  Auto Neutrophil # : 5.8 K/uL  Auto Lymphocyte # : 1.6 K/uL  Auto Monocyte # : 0.5 K/uL  Auto Eosinophil # : 0.1 K/uL  Auto Basophil # : 0.0 K/uL  Auto Neutrophil % : 73.0 %  Auto Lymphocyte % : 20.1 %  Auto Monocyte % : 5.8 %  Auto Eosinophil % : 0.9 %  Auto Basophil % : 0.2 %    08-29    135  |  101  |  34<H>  ----------------------------<  168<H>  5.1   |  20<L>  |  0.81    Ca    9.8      29 Aug 2019 02:45    TPro  7.0  /  Alb  3.9  /  TBili  0.2  /  DBili  x   /  AST  37  /  ALT  41  /  AlkPhos  66  08-29    LIVER FUNCTIONS - ( 29 Aug 2019 02:45 )  Alb: 3.9 g/dL / Pro: 7.0 g/dL / ALK PHOS: 66 U/L / ALT: 41 U/L / AST: 37 U/L / GGT: x                     ____________________________________________  MICRO:  RECENT CULTURES:    ____________________________________________  RADIOLOGY:

## 2019-08-29 NOTE — CONSULT NOTE ADULT - SUBJECTIVE AND OBJECTIVE BOX
[facility] Ophthalmology Consult Note    HPI:  The pt is an 83 y/o female who presents to the ED after sustaining a mechanical fall (tripped over some shoes on the floor) last night at 10 pm. The pt fell onto her chin and was not able to completely brace her fall. The fall was not witnessed, pt denies loss of consciousness. Initial evaluation in the ED included: CT Max which demonstrated fracture of the right anterior or posterior maxillary wall, fracture of the right inferior orbital wall with mild depression of fracture fragments, fracture of right lateral orbital wall, small volume of air in the right inferior preseptaptal soft tissues. No retrobulbar hematoma, no deviation of the EOMs.     Pt denies any change in vision, diplopia, vision loss, flashes, floaters. Ophthalmology are consulted for exam.     PMH: Lung nodule s/p 10/9/18 VATS LULx, MLND bronchoplasty, mediastinoscopy. Pt completed course of ALIMTA/CARBOPLATIN    Meds: None  POcHx (including surgeries/lasers/trauma):  None  Drops: None  FamHx: None  Social Hx: None  Allergies: NKDA    ROS:  General (weight loss), Vision (per HPI), Head and Neck (neg), Pulm (neg), CV (neg), GI (neg),  (neg), Musculoskeletal (neg), Skin/Integ (neg), Neuro (neg), Endocrine (neg), Heme (neg), All/Immuno (neg)    Mood and Affect Appropriate ( x ),  Oriented to Time, Place, and Person x 3 ( x )    Ophthalmology Exam    Visual acuity (sc): 20/40 OU  Pupils: PERRL OU, no APD  Ttono: 18 OU   Extraocular movements (EOMs): Full OU, no pain, no diplopia  Confrontational Visual Field (CVF):  Full OU  Color Plates: 12/12 OU    Slit Lamp Exam (SLE)  External:  OD: ecchymosis over inferior orbital rim, ecchymosis of upper eyelid. Flat OS   Lids/Lashes/Lacrimal Ducts: Flat OU   Sclera/Conjunctiva:  W+Q OU  Cornea: Cl OU  Anterior Chamber: D+Q OU   Iris:  Flat OU  Lens:  Cl OU    Patient and daughter adamantly refused the dilated fundus exam, stating they did not want her vision to be temporarily blurry. Explained risks of incomplete eye exam including vision loss and blurry vision, pt expressed understanding.     Assessment and Plan:  83 y/o female who presents to the ED after sustaining a mechanical fall last night at 10 pm. CT demonstrates: fracture of the right anterior or posterior maxillary wall, fracture of the right inferior orbital wall with mild depression of fracture fragments, fracture of right lateral orbital wall, small volume of air in the right inferior preseptaptal soft tissues. No retrobulbar hematoma, no deviation of the EOMs.     Right orbital fracture   - PO Antibiotics as per primary team  - no nose blowing   - can use nasal decongestants for 3 days to limit nose blowing  - Ice packs to the right eyelid for 20 minutes every 2 hours for first 48 hours after trauma  - attempt 30 degree incline at rest       Follow-Up:  Patient should follow up his/her ophthalmologist or in the [facility] within 1 week of discharge  [address] [place] [address]  [phone]

## 2019-08-29 NOTE — ED PROVIDER NOTE - ATTENDING CONTRIBUTION TO CARE
85yo female no a/c p/w mechanical fall periorbital ecchymosis TTP right zygoma concern for fx. Bilateral chest wall pain with no crepitus or stepoffs. CXR, CT head/cspine/maxface. Pain control. Lac repair.

## 2019-08-29 NOTE — ED PROVIDER NOTE - PROGRESS NOTE DETAILS
Complex right zygomatic fracture. Plastics consulted. No signs of entrapment on CT or on exam. Paged plastics for second time at 5:05am. Plastics and optho no acute intervention, f/u outpt with abx

## 2019-08-29 NOTE — ED ADULT NURSE NOTE - ED STAT RN HANDOFF DETAILS
Report received from night nurse Stefanie GAMBLE. Pt was eval by plastic surg. TBD home. Bandage intact at chin. Ecchymosis right cheek. denies visual changes. A&Ox4[facility] Denies dizziness or HA. Mild pain in left ribs. Denies SOB. Dr aware. Fall risk precautions maintained. color pink. Skin W&D. Lungs clear. Abd soft.

## 2019-08-29 NOTE — ED ADULT NURSE NOTE - OBJECTIVE STATEMENT
84 YOF A&Ox3 presents to ED for injuries from a fall. Pt's daughter at bedside pt tripped and fell on her face. Pt complains of right rib pain and right lower lip pain. patient noted to have laceration of right lower lip. pt denies hitting head or LOC.  pt denies being on blood thinners. pt denies sob, chest pain, n/v/d, headaches & blurry vision. safety maintained. 84 YOF A&Ox3 presents to ED for injuries from a fall. Pt's daughter at bedside pt was walking and tripped over shoes and fell on her face. Pt complains of right rib pain and right lower lip pain. patient noted to have laceration of right lower lip. pt denies hitting head or LOC.  pt denies being on blood thinners. pt denies sob, chest pain, n/v/d, headaches & blurry vision. safety maintained. 84 YOF A&Ox3 with pmh of lung cancer presents to ED for injuries from a fall. Pt's daughter at bedside pt was walking and tripped over shoes and fell on her face. Pt complains of right rib pain and right lower lip pain. patient noted to have laceration of right lower lip. pt denies hitting head or LOC.  pt denies being on blood thinners. pt denies sob, chest pain, n/v/d, headaches & blurry vision. safety maintained.

## 2019-08-29 NOTE — CONSULT NOTE ADULT - ASSESSMENT
84F s/p mechanical fall resulting in R ZMC fracture, minimally displaced with small inferior orbital rim step-off.  This could be surgically corrected, the indications including restoring facial symmetry and preventing diplopia.  Pt currently has no vision complaints and she and her daughter were weary of undergoing surgery unless absolutely necessary due to her overall situation.  This is a reasonable approach.      -no surgery planned at this time  -sinus precautions - no nose blowing, sneeze with mouth open  -follow up with Dr. Childress next week, [phone].  Please call today to set-up your appointment.     [id number] 84F s/p mechanical fall resulting in R ZMC fracture, minimally displaced with small inferior orbital rim step-off.  This could be surgically corrected, the indications including restoring facial symmetry and preventing diplopia.  Pt currently has no vision complaints and she and her daughter were weary of undergoing surgery unless absolutely necessary due to her overall situation.  This is a reasonable approach.      -no surgery planned at this time  -sinus precautions - no nose blowing, sneeze with mouth open  - Please follow opthalmology recommendations - appreciate care   -follow up with Dr. Childress next week, [phone].  Please call today to set-up your appointment.     Plastic Surgery   [phone]

## 2019-08-29 NOTE — ED PROVIDER NOTE - OBJECTIVE STATEMENT
83yo female pmh lung CA s/p surgery and chemo p/w fall on face just prior to evaluation in ED. No LOC. Unwitnessed. Pt was walking in the dark, tripped over shoes and fell. C/o right facial pain and bilateral chest wall pain. No a/c. Denies dizziness prior to or after the fall. Denies n/v/d, vision changes, abd pain, confusion, pelvis pain, extremity pain. Ambulating well without difficulty. Tetanus up to date.

## 2019-08-29 NOTE — ED PROVIDER NOTE - CARE PROVIDER_API CALL
Rohit Childress)  Plastic Surgery  [address], [place] [address]  Phone: [phone]  Fax: [phone]  Follow Up Time:

## 2019-11-05 ENCOUNTER — NON-APPOINTMENT (OUTPATIENT)
Age: 84
End: 2019-11-05

## 2019-11-05 ENCOUNTER — APPOINTMENT (OUTPATIENT)
Dept: PULMONOLOGY | Facility: CLINIC | Age: 84
End: 2019-11-05
Payer: MEDICARE

## 2019-11-05 VITALS
HEART RATE: 115 BPM | SYSTOLIC BLOOD PRESSURE: 120 MMHG | WEIGHT: 112 LBS | HEIGHT: 61 IN | OXYGEN SATURATION: 96 % | BODY MASS INDEX: 21.14 KG/M2 | DIASTOLIC BLOOD PRESSURE: 70 MMHG | RESPIRATION RATE: 16 BRPM

## 2019-11-05 DIAGNOSIS — R26.89 OTHER ABNORMALITIES OF GAIT AND MOBILITY: ICD-10-CM

## 2019-11-05 PROCEDURE — 99214 OFFICE O/P EST MOD 30 MIN: CPT | Mod: 25

## 2019-11-05 PROCEDURE — 94010 BREATHING CAPACITY TEST: CPT

## 2019-11-05 RX ORDER — FLUTICASONE FUROATE AND VILANTEROL TRIFENATATE 200; 25 UG/1; UG/1
200-25 POWDER RESPIRATORY (INHALATION) DAILY
Qty: 1 | Refills: 5 | Status: DISCONTINUED | COMMUNITY
Start: 2019-02-20 | End: 2019-11-05

## 2019-11-05 RX ORDER — FLUTICASONE FUROATE, UMECLIDINIUM BROMIDE AND VILANTEROL TRIFENATATE 100; 62.5; 25 UG/1; UG/1; UG/1
100-62.5-25 POWDER RESPIRATORY (INHALATION)
Qty: 3 | Refills: 1 | Status: ACTIVE | COMMUNITY
Start: 2019-11-05 | End: 1900-01-01

## 2019-11-05 NOTE — HISTORY OF PRESENT ILLNESS
[FreeTextEntry1] : Ms. Cabral is an 84 year old female coming into the office today for a follow up visit. Her chief complaint is weak legs.\par \par - Pt has recently fell few times doing daily tasks such as throwing out garbage and picking up her shoes. \par - Pt states her body is weak especially after surgery and after chemo \par - Pt is going to PT for her legs\par - Pt is SOB especially when walking a few steps \par - Pt does not have wheezing \par - Pt does need the food has to be softer especially with her dentures. \par - Pt lacks energy in her voice \par - Pt does state that her back itches but not her eyes nor her nose. \par - Pt denies visual problems \par - Once in a while she coughs but no wheezing at the current time. \par - Pt was coughing a lot at the time of the surgery \par - Pt is not using an inhaler at this time. \par - Pt states when using the inhaler before, she states that she experienced some type of side effect. \par - Pt states that right now she goes to physical therapy and it is ending this coming week. \par \par denies any  chest pain, chest pressure, diarrhea, constipation, dizziness, leg swelling, leg pain, itchy eyes, itchy ears, heartburn, reflux, or sour taste in the mouth.\par

## 2019-11-05 NOTE — ASSESSMENT
[FreeTextEntry1] : Ms. Cabral is an 84 year old female with no prior medical history. Following a fall in July 2018, she had a CT scan of the chest performed, revealing left upper lung nodule with a large hilar lymph node. She was subsequently referred for a PET/CT scan, which revealed hypermetabolic left upper lobe 2.2 cm lung nodule with suspicion for b/l hilar lymph node involvement as well as mediastinal involvement. A work-up revealed adenocarcinoma c/w lung primary. Mediastinal and left-sided lymph node sampling was negative. She underwent a VATS left upper lobectomy and mediastinal and hilar lymphadenectomy. Pathology revealed a stage IIIA adenocarcinoma based on mediastinal lymph node involvement. Tumor contains an activating EGFR mutation. She presents to the office today s/p Adjuvant chemotherapy with Carboplatin and Pemetrexed (11/20/2018- 1/22/2019) for a follow up pulmonary re-evaluation. She is stable from a pulmonary perspective and is s/p a recent Quantiferon Gold. Patient currently has poor balance/weakness/SOB. 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 Presley Burrell \par -Complete CTs as per oncologist\par \par Problem 2: RADS \par -  continue Trelegy 1 puff QD\par -Inhaler technique reviewed as well as oral hygiene techniques reviewed with patient. Avoidance of cold air, extremes of temperature, rescue inhaler should be used before exercise. Order of medication reviewed with patient. Recommended use of a cool mist humidifier in the bedroom. \par \par Problem 3: Silent Reflux/ GERD (Elevated Hemidiaphragm )\par -add Pepcid Complete 40 mg QHS\par - Things to avoid including overeating, spicy foods, tight clothing, eating within three hours of bed, this list is not all inclusive. \par -For treatment of reflux, possible options discussed including diet control, H2 blockers, PPIs, as well as coating motility agents discussed as treatment options. Timing of meals and proximity of last meal to sleep were discussed. If symptoms persist, a formal gastrointestinal evaluation is needed.\par \par Problem 4: Poor Breathing Mechanics \par - Proper breathing techniques were reviewed with an emphasis of exhalation. Patient instructed to breath in for 1 second and out for four seconds. Patient was encouraged to not talk while walking.\par \par Problem 5: Positive Quantiferon Gold\par -Currently under observation; family hold on Rx\par \par Problem 6: Health maintenance \par - Recommend pulmonary rehab 2020 for poor balance therapy. S/p balance therapy evaluation \par -Recommended to use [employer] or [place] [place] for her voice\par -Refused flu shot 2019 \par -recommended strep pneumonia vaccines: Prevnar-13 vaccine, followed by Pneumo vaccine 23 one year following\par -recommended early intervention for URIs\par -recommended regular osteoporosis evaluations\par -recommended early dermatological evaluations\par -recommended after the age of 50 to the age of 70, colonoscopy every 5 years\par \par f/u in 3 months with full PFTs\par pt is encouraged to call or fax the office with any questions or concerns. \par Explained to the pt in full detail with demonstrations how to use the inhalers and inhaler hygiene. \par -Education provided to the PT regarding their visit and conditions.

## 2019-11-05 NOTE — PROCEDURE
[FreeTextEntry1] : PFT revealed moderately restrictive and moderately obstructive dysfunction with a FEV1 of 0.83 L, which is 52 % of predicted, with a normal flow volume loop. \par \par \par

## 2019-11-05 NOTE — PHYSICAL EXAM
[General Appearance - Well Developed] : well developed [Normal Appearance] : normal appearance [Well Groomed] : well groomed [General Appearance - Well Nourished] : well nourished [No Deformities] : no deformities [General Appearance - In No Acute Distress] : no acute distress [Normal Conjunctiva] : the conjunctiva exhibited no abnormalities [Eyelids - No Xanthelasma] : the eyelids demonstrated no xanthelasmas [Normal Oropharynx] : normal oropharynx [II] : II [Neck Appearance] : the appearance of the neck was normal [Neck Cervical Mass (___cm)] : no neck mass was observed [Jugular Venous Distention Increased] : there was no jugular-venous distention [Thyroid Diffuse Enlargement] : the thyroid was not enlarged [Thyroid Nodule] : there were no palpable thyroid nodules [Respiration, Rhythm And Depth] : normal respiratory rhythm and effort [Exaggerated Use Of Accessory Muscles For Inspiration] : no accessory muscle use [Auscultation Breath Sounds / Voice Sounds] : lungs were clear to auscultation bilaterally [Kyphosis] : kyphosis [Abdomen Soft] : soft [Abdomen Tenderness] : non-tender [Abdomen Mass (___ Cm)] : no abdominal mass palpated [Abnormal Walk] : normal gait [Gait - Sufficient For Exercise Testing] : the gait was sufficient for exercise testing [Nail Clubbing] : no clubbing of the fingernails [Cyanosis, Localized] : no localized cyanosis [Petechial Hemorrhages (___cm)] : no petechial hemorrhages [No Venous Stasis] : no venous stasis [Skin Lesions] : no skin lesions [No Skin Ulcers] : no skin ulcer [No Xanthoma] : no  xanthoma was observed [Deep Tendon Reflexes (DTR)] : deep tendon reflexes were 2+ and symmetric [Sensation] : the sensory exam was normal to light touch and pinprick [No Focal Deficits] : no focal deficits [Oriented To Time, Place, And Person] : oriented to person, place, and time [Impaired Insight] : insight and judgment were intact [Affect] : the affect was normal [Skin Color & Pigmentation] : normal skin color and pigmentation [Skin Turgor] : normal skin turgor [] : no rash [FreeTextEntry1] : mild kyphosis

## 2019-11-05 NOTE — ADDENDUM
[FreeTextEntry1] : Documented by Gigi Quiñonez acting as a scribe for Dr. Anshul Mann on 11/05/2019 \par \par All medical record entries made by the Scribe were at my, Dr. Anshul Mann's, direction and personally dictated by me on 11/05/2019 . I have reviewed the chart and agree that the record accurately reflects my personal performance of the history, physical exam, assessment and plan. I have also personally directed, reviewed, and agree with the discharge instructions.\par

## 2019-11-05 NOTE — REASON FOR VISIT
[Follow-Up] : a follow-up visit [Family Member] : family member [FreeTextEntry1] : Chronic cough, (+) for Quantifuron Gold, Stage 3A Lung cancer, RADS, GERD

## 2020-02-27 ENCOUNTER — APPOINTMENT (OUTPATIENT)
Dept: PULMONOLOGY | Facility: CLINIC | Age: 85
End: 2020-02-27

## 2020-03-02 ENCOUNTER — APPOINTMENT (OUTPATIENT)
Dept: PULMONOLOGY | Facility: CLINIC | Age: 85
End: 2020-03-02

## 2020-03-04 RX ORDER — NEBULIZER ACCESSORIES
KIT MISCELLANEOUS
Qty: 1 | Refills: 0 | Status: ACTIVE | COMMUNITY
Start: 2020-03-04 | End: 1900-01-01

## 2020-03-14 NOTE — ED PROVIDER NOTE - CHIEF COMPLAINT
The patient is a 84y Female complaining of fall.
Const: Well-nourished, Well-developed, appearing stated age.  HEENT: +scrape on right forehead above eye, no lesions. Atraumatic external nose and ears. Moist MM.  CVS: +S1/S2, Peripheral pulses 2+ and equal in all extremities.   RESP: Unlabored respiratory effort. Clear to auscultation bilaterally.  GI: Nontender/Nondistended, No hepatosplenomegaly.  MSK: Normocephalic/Atraumatic, Extremities w/o deformity or ttp. +TTP along posterior chest wall on right, no midline tenderness along spine.   Neuro: CNs II-XII grossly intact. Motor & Sensation grossly intact.  Psych: Awake, Alert, & Oriented (AAO) x3. Appropriate mood and affect.

## 2020-04-21 ENCOUNTER — RX RENEWAL (OUTPATIENT)
Age: 85
End: 2020-04-21

## 2020-05-28 ENCOUNTER — APPOINTMENT (OUTPATIENT)
Dept: PULMONOLOGY | Facility: CLINIC | Age: 85
End: 2020-05-28
Payer: MEDICARE

## 2020-05-28 DIAGNOSIS — R05 COUGH: ICD-10-CM

## 2020-05-28 DIAGNOSIS — C34.12 MALIGNANT NEOPLASM OF UPPER LOBE, LEFT BRONCHUS OR LUNG: ICD-10-CM

## 2020-05-28 DIAGNOSIS — J96.11 CHRONIC RESPIRATORY FAILURE WITH HYPOXIA: ICD-10-CM

## 2020-05-28 DIAGNOSIS — R91.1 SOLITARY PULMONARY NODULE: ICD-10-CM

## 2020-05-28 DIAGNOSIS — Z71.89 OTHER SPECIFIED COUNSELING: ICD-10-CM

## 2020-05-28 PROCEDURE — 99214 OFFICE O/P EST MOD 30 MIN: CPT | Mod: 95

## 2020-05-28 RX ORDER — RANITIDINE 300 MG/1
300 TABLET ORAL
Qty: 90 | Refills: 1 | Status: DISCONTINUED | COMMUNITY
Start: 2019-02-20 | End: 2020-05-28

## 2020-05-28 NOTE — REASON FOR VISIT
[Follow-Up] : a follow-up visit [Family Member] : family member [FreeTextEntry1] : video call - Chronic cough, (+) for Quantifuron Gold, Stage 3A Lung cancer, RADS, GERD

## 2020-05-28 NOTE — ASSESSMENT
[FreeTextEntry1] : Ms. Cabral is an 84 year old female with no prior medical history. Following a fall in July 2018, she had a CT scan of the chest performed, revealing left upper lung nodule with a large hilar lymph node. She was subsequently referred for a PET/CT scan, which revealed hypermetabolic left upper lobe 2.2 cm lung nodule with suspicion for b/l hilar lymph node involvement as well as mediastinal involvement. A work-up revealed adenocarcinoma c/w lung primary. Mediastinal and left-sided lymph node sampling was negative. She underwent a VATS left upper lobectomy and mediastinal and hilar lymphadenectomy. Pathology revealed a stage IIIA adenocarcinoma based on mediastinal lymph node involvement. Tumor contains an activating EGFR mutation. She presents to the office today s/p Adjuvant chemotherapy with Carboplatin and Pemetrexed (11/20/2018- 1/22/2019) for a follow up pulmonary re-evaluation. She is stable from a pulmonary perspective and is s/p a recent Quantiferon Gold. Patient currently has poor balance/weakness/SOB. 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 Presley Burrell \par -Complete CTs as per oncologist (follow up)\par \par Problem 2: RADS \par -  continue Trelegy 1 puff QD\par -add Albuterol (0.83) by nebulizer QID  (gargle and rinse after use) \par -add Pulmicort (180) 2 inhalations BID\par -Inhaler technique reviewed as well as oral hygiene techniques reviewed with patient. Avoidance of cold air, extremes of temperature, rescue inhaler should be used before exercise. Order of medication reviewed with patient. Recommended use of a cool mist humidifier in the bedroom. \par \par problem 3: ?Respiratory Failure\par -f/u 6 minute walk for qualifying\par \par Problem 4: Silent Reflux/ GERD (Elevated Hemidiaphragm )\par -add Pepcid 40 mg QHS\par - Things to avoid including overeating, spicy foods, tight clothing, eating within three hours of bed, this list is not all inclusive. \par -For treatment of reflux, possible options discussed including diet control, H2 blockers, PPIs, as well as coating motility agents discussed as treatment options. Timing of meals and proximity of last meal to sleep were discussed. If symptoms persist, a formal gastrointestinal evaluation is needed.\par \par Problem 5: Poor Breathing Mechanics \par - Proper breathing techniques were reviewed with an emphasis of exhalation. Patient instructed to breath in for 1 second and out for four seconds. Patient was encouraged to not talk while walking.\par \par Problem 6: Positive Quantiferon Gold\par -Currently under observation; family hold on Rx\par \par Problem 7: Health Maintenance/COVID19 Precautions:\par - Clean your hands often. Wash your hands often with soap and water for at least 20 seconds, especially after blowing your nose, coughing, or sneezing, or having been in a public place.\par - If soap and water are not available, use a hand  that contains at least 60% alcohol.\par - To the extent possible, avoid touching high-touch surfaces in public places - elevator buttons, door handles, handrails, handshaking with people, etc. Use a tissue or your sleeve to cover your hand or finger if you must touch something.\par - Wash your hands after touching surfaces in public places.\par - Avoid touching your face, nose, eyes, etc.\par - Clean and disinfect your home to remove germs: practice routine cleaning of frequently touched surfaces (for example: tables, doorknobs, light switches, handles, desks, toilets, faucets, sinks & cell phones)\par - Avoid crowds, especially in poorly ventilated spaces. Your risk of exposure to respiratory viruses like COVID-19 may increase in crowded, closed-in settings with little air circulation if there are people in the crowd who are sick. All patients are recommended to practice social distancing and stay at least 6 feet away from others.\par - Avoid all non-essential travel including plane trips, and especially avoid embarking on cruise ships.\par -If COVID-19 is spreading in your community, take extra measures to put distance between yourself and other people to further reduce your risk of being exposed to this new virus.\par -Stay home as much as possible.\par - Consider ways of getting food brought to your house through family, social, or commercial networks\par -Be aware that the virus has been known to live in the air up to 3 hours post exposure, cardboard up to 24 hours post exposure, copper up to 4 hours post exposure, steel and plastic up to 2-3 days post exposure. Risk of transmission from these surfaces are affected by many variables.\par Immune Support Recommendations:\par -OTC Vitamin C 500mg BID \par -OTC Quercetin 250-500mg BID \par -OTC Zinc 75-100mg per day \par -OTC Melatonin 1 or 2 mg a night \par -OTC Vitamin D 1-4000mg per day \par -OTC Tonic Water 8oz per day\par Asthma and COVID19:\par You need to make sure your asthma is under control. This often requires the use of inhaled corticosteroids (and sometimes oral corticosteroids). Inhaled corticosteroids do not likely reduce your immune system’s ability to fight infections, but oral corticosteroids may. It is important to use the steps above to protect yourself to limit your exposure to any respiratory virus. \par \par Problem 8: Health maintenance \par -recommended pro appetite Remeron 15 mg qday\par - Recommend pulmonary rehab 2020 for poor balance therapy. S/p balance therapy evaluation \par -Recommended to use Mouthkote or [employer] for her voice\par -Refused flu shot 2019 \par -recommended strep pneumonia vaccines: Prevnar-13 vaccine, followed by Pneumo vaccine 23 one year following\par -recommended early intervention for URIs\par -recommended regular osteoporosis evaluations\par -recommended early dermatological evaluations\par -recommended after the age of 50 to the age of 70, colonoscopy every 5 years\par \par f/u in 3 months with full PFTs, 6 minute walk\par pt is encouraged to call or fax the office with any questions or concerns. \par Explained to the pt in full detail with demonstrations how to use the inhalers and inhaler hygiene. \par -Education provided to the PT regarding their visit and conditions.

## 2020-05-28 NOTE — ADDENDUM
[FreeTextEntry1] : Documented by Wayne Waters acting as a scribe for Dr. Anshul Mann on 05/28/2020.\par \par All medical record entries made by the Scribe were at my, Dr. Anshul Mann's, direction and personally dictated by me on 05/28/2020. I have reviewed the chart and agree that the record accurately reflects my personal performance of the history, physical exam, assessment and plan. I have also personally directed, reviewed, and agree with the discharge instructions.

## 2020-05-28 NOTE — PHYSICAL EXAM
[No Acute Distress] : no acute distress [Well Nourished] : well nourished [No Deformities] : no deformities [Well Developed] : well developed

## 2020-06-01 RX ORDER — MIRTAZAPINE 15 MG/1
15 TABLET, FILM COATED ORAL
Qty: 1 | Refills: 3 | Status: DISCONTINUED | COMMUNITY
Start: 2020-05-28 | End: 2020-06-01

## 2020-06-17 RX ORDER — ALBUTEROL SULFATE 2.5 MG/3ML
(2.5 MG/3ML) SOLUTION RESPIRATORY (INHALATION) EVERY 6 HOURS
Qty: 225 | Refills: 1 | Status: ACTIVE | COMMUNITY
Start: 2020-03-04 | End: 1900-01-01

## 2020-07-09 ENCOUNTER — APPOINTMENT (OUTPATIENT)
Dept: PULMONOLOGY | Facility: CLINIC | Age: 85
End: 2020-07-09
Payer: MEDICARE

## 2020-07-09 VITALS
HEART RATE: 119 BPM | OXYGEN SATURATION: 96 % | TEMPERATURE: 97.9 F | SYSTOLIC BLOOD PRESSURE: 95 MMHG | HEIGHT: 61 IN | DIASTOLIC BLOOD PRESSURE: 60 MMHG | RESPIRATION RATE: 17 BRPM

## 2020-07-09 DIAGNOSIS — C34.90 MALIGNANT NEOPLASM OF UNSPECIFIED PART OF UNSPECIFIED BRONCHUS OR LUNG: ICD-10-CM

## 2020-07-09 DIAGNOSIS — R76.12 NONSPECIFIC REACTION TO CELL MEDIATED IMMUNITY MEASUREMENT OF GAMMA INTERFERON ANTIGEN RESPONSE W/OUT ACTIVE TUBERCULOSIS: ICD-10-CM

## 2020-07-09 DIAGNOSIS — C34.10 MALIGNANT NEOPLASM OF UPPER LOBE, UNSPECIFIED BRONCHUS OR LUNG: ICD-10-CM

## 2020-07-09 DIAGNOSIS — J45.909 UNSPECIFIED ASTHMA, UNCOMPLICATED: ICD-10-CM

## 2020-07-09 DIAGNOSIS — R94.2 ABNORMAL RESULTS OF PULMONARY FUNCTION STUDIES: ICD-10-CM

## 2020-07-09 DIAGNOSIS — K21.9 GASTRO-ESOPHAGEAL REFLUX DISEASE W/OUT ESOPHAGITIS: ICD-10-CM

## 2020-07-09 DIAGNOSIS — R06.02 SHORTNESS OF BREATH: ICD-10-CM

## 2020-07-09 PROCEDURE — 99214 OFFICE O/P EST MOD 30 MIN: CPT

## 2020-07-09 RX ORDER — MIRTAZAPINE 30 MG/1
30 TABLET, ORALLY DISINTEGRATING ORAL
Qty: 30 | Refills: 1 | Status: ACTIVE | COMMUNITY
Start: 2020-06-01 | End: 1900-01-01

## 2020-07-16 NOTE — HISTORY OF PRESENT ILLNESS
[FreeTextEntry1] : Ms. Cabral is an 85 year old female  who presents to the office for a follow up visit. Her chief complaint is \par \par -she notes continued weight loss\par -she notes during anxiety fits weight loss\par -she notes currently in wheel chair from early 7/2020 due to discomfort in feet and weakness\par -she notes cachectic \par -she notes was in good health prior to surgery\par -she notes balance is off\par -she notes general body weakness\par -she notes MBS and esophagram scheduled for monitoring motility\par -she notes general dysphagia with eating and drinking starting 3/2020\par -she notes difficulties talking\par -she notes vocal hoarseness\par -she notes planning to f/u with neurologist\par -she notes poor appetite \par \par -she denies any headaches, nausea, vomiting, fever, chills, sweats, chest pain, chest pressure, diarrhea, constipation, dizziness, sour taste in the mouth, leg swelling, leg pain, itchy eyes, itchy ears, heartburn, reflux.

## 2020-07-16 NOTE — PHYSICAL EXAM
[No Acute Distress] : no acute distress [Normal Appearance] : normal appearance [Normal Oropharynx] : normal oropharynx [No Neck Mass] : no neck mass [Normal S1, S2] : normal s1, s2 [Normal Rate/Rhythm] : normal rate/rhythm [No Murmurs] : no murmurs [No Resp Distress] : no resp distress [No Abnormalities] : no abnormalities [Clear to Auscultation Bilaterally] : clear to auscultation bilaterally [Benign] : benign [No Edema] : no edema [No Cyanosis] : no cyanosis [No Clubbing] : no clubbing [FROM] : FROM [Normal Color/ Pigmentation] : normal color/ pigmentation [No Focal Deficits] : no focal deficits [Normal Affect] : normal affect [Oriented x3] : oriented x3 [II] : Mallampati Class: II [TextBox_2] : cachectic [TextBox_68] : I:E ratio 1:3; clear  [TextBox_99] : in wheelchair

## 2020-07-16 NOTE — ADDENDUM
[FreeTextEntry1] : *****amended by Wayne Waters on 7/16/2020**************\par \par Documented by Wayne Waters acting as a scribe for Dr. Anshul Mann on 07/09/2020.\par \par All medical record entries made by the Scribe were at my, Dr. Anshul Mann's, direction and personally dictated by me on 07/09/2020. I have reviewed the chart and agree that the record accurately reflects my personal performance of the history, physical exam, assessment and plan. I have also personally directed, reviewed, and agree with the discharge instructions.

## 2020-07-16 NOTE — PROCEDURE
[FreeTextEntry1] : \par CT Chest (7.7.2020) reveals s/p left upper lobectomy tiny 3 mm nodules in RUL new. Small nodules tiny in RML c/w inflammation. \par \par

## 2020-07-16 NOTE — ASSESSMENT
[FreeTextEntry1] : Ms. Cabral is an 85 year old female with no prior medical history. Following a fall in July 2018, she had a CT scan of the chest performed, revealing left upper lung nodule with a large hilar lymph node. She was subsequently referred for a PET/CT scan, which revealed hypermetabolic left upper lobe 2.2 cm lung nodule with suspicion for b/l hilar lymph node involvement as well as mediastinal involvement. A work-up revealed adenocarcinoma c/w lung primary. Mediastinal and left-sided lymph node sampling was negative. She underwent a VATS left upper lobectomy and mediastinal and hilar lymphadenectomy. Pathology revealed a stage IIIA adenocarcinoma based on mediastinal lymph node involvement. Tumor contains an activating EGFR mutation. She presents to the office today s/p Adjuvant chemotherapy with Carboplatin and Pemetrexed (11/20/2018- 1/22/2019) for a follow up pulmonary re-evaluation. She is stable from a pulmonary perspective and is s/p a recent Quantiferon Gold. Patient currently has poor balance/weakness/Speech/ appetite\par \par DDx Cough\par - RADS\par - Silent Reflux/ GERD (elevated hemidiaphragm)\par \par The patient's SOB is felt to be multifactorial:\par - Debility \par - Out of Shape\par - Poor breathing mechanics\par - Restrictive Dysfunction Secondary to Left Upper Lobectomy\par - RADS\par \par Problem 1: Lung Cancer Stage 3A with End Two Lymph Node Involvement: EGFR and PDL-1 Positive \par - S/p four cycles of Chemotherapy\par - Recommended radiation consultation with Dr. Presley Burrell \par -Complete CTs as per oncologist (follow up)\par \par Problem 2: RADS \par -  continue Trelegy 1 puff QD\par -continue Albuterol (0.83) by nebulizer QID  (gargle and rinse after use) \par -continue Pulmicort (180) 2 inhalations BID\par -Inhaler technique reviewed as well as oral hygiene techniques reviewed with patient. Avoidance of cold air, extremes of temperature, rescue inhaler should be used before exercise. Order of medication reviewed with patient. Recommended use of a cool mist humidifier in the bedroom. \par \par problem 3: ?Respiratory Failure\par -f/u 6 minute walk for qualifying\par \par Problem 4: Silent Reflux/ GERD (Elevated Hemidiaphragm )- MBS/ esophageal motility study \par -continue Pepcid 40 mg QHS\par - Things to avoid including overeating, spicy foods, tight clothing, eating within three hours of bed, this list is not all inclusive. \par -For treatment of reflux, possible options discussed including diet control, H2 blockers, PPIs, as well as coating motility agents discussed as treatment options. Timing of meals and proximity of last meal to sleep were discussed. If symptoms persist, a formal gastrointestinal evaluation is needed.\par \par Problem 5: Poor Breathing Mechanics \par - Proper breathing techniques were reviewed with an emphasis of exhalation. Patient instructed to breath in for 1 second and out for four seconds. Patient was encouraged to not talk while walking.\par \par Problem 6: Positive Quantiferon Gold\par -Currently under observation; family hold on Rx\par \par Problem 7: Health Maintenance/COVID19 Precautions:\par - Clean your hands often. Wash your hands often with soap and water for at least 20 seconds, especially after blowing your nose, coughing, or sneezing, or having been in a public place.\par - If soap and water are not available, use a hand  that contains at least 60% alcohol.\par - To the extent possible, avoid touching high-touch surfaces in public places - elevator buttons, door handles, handrails, handshaking with people, etc. Use a tissue or your sleeve to cover your hand or finger if you must touch something.\par - Wash your hands after touching surfaces in public places.\par - Avoid touching your face, nose, eyes, etc.\par - Clean and disinfect your home to remove germs: practice routine cleaning of frequently touched surfaces (for example: tables, doorknobs, light switches, handles, desks, toilets, faucets, sinks & cell phones)\par - Avoid crowds, especially in poorly ventilated spaces. Your risk of exposure to respiratory viruses like COVID-19 may increase in crowded, closed-in settings with little air circulation if there are people in the crowd who are sick. All patients are recommended to practice social distancing and stay at least 6 feet away from others.\par - Avoid all non-essential travel including plane trips, and especially avoid embarking on cruise ships.\par -If COVID-19 is spreading in your community, take extra measures to put distance between yourself and other people to further reduce your risk of being exposed to this new virus.\par -Stay home as much as possible.\par - Consider ways of getting food brought to your house through family, social, or commercial networks\par -Be aware that the virus has been known to live in the air up to 3 hours post exposure, cardboard up to 24 hours post exposure, copper up to 4 hours post exposure, steel and plastic up to 2-3 days post exposure. Risk of transmission from these surfaces are affected by many variables.\par Immune Support Recommendations:\par -OTC Vitamin C 500mg BID \par -OTC Quercetin 250-500mg BID \par -OTC Zinc 75-100mg per day \par -OTC Melatonin 1 or 2 mg a night \par -OTC Vitamin D 1-4000mg per day \par -OTC Tonic Water 8oz per day\par Asthma and COVID19:\par You need to make sure your asthma is under control. This often requires the use of inhaled corticosteroids (and sometimes oral corticosteroids). Inhaled corticosteroids do not likely reduce your immune system’s ability to fight infections, but oral corticosteroids may. It is important to use the steps above to protect yourself to limit your exposure to any respiratory virus. \par \par Problem 8: Health maintenance \par -Recommended Dr. Jorge L Verma (neurologist) for neuro evaluation\par -recommended pro appetite Remeron 15 mg qday-can be boosted to 30mg per day\par - Recommend pulmonary rehab 2020 for poor balance therapy. S/p balance therapy evaluation \par -Recommended to use M[employer] or [employer] for her voice\par -Refused flu shot 2019 \par -recommended strep pneumonia vaccines: Prevnar-13 vaccine, followed by Pneumo vaccine 23 one year following\par -recommended early intervention for URIs\par -recommended regular osteoporosis evaluations\par -recommended early dermatological evaluations\par -recommended after the age of 50 to the age of 70, colonoscopy every 5 years\par \par f/u in 3 months with full PFTs, 6 minute walk\par pt is encouraged to call or fax the office with any questions or concerns. \par Explained to the pt in full detail with demonstrations how to use the inhalers and inhaler hygiene. \par -Education provided to the PT regarding their visit and conditions.

## 2020-07-22 ENCOUNTER — APPOINTMENT (OUTPATIENT)
Dept: SPEECH THERAPY | Facility: HOSPITAL | Age: 85
End: 2020-07-22

## 2020-07-22 ENCOUNTER — APPOINTMENT (OUTPATIENT)
Dept: RADIOLOGY | Facility: HOSPITAL | Age: 85
End: 2020-07-22

## 2020-08-28 ENCOUNTER — APPOINTMENT (OUTPATIENT)
Dept: PULMONOLOGY | Facility: CLINIC | Age: 85
End: 2020-08-28

## 2020-10-22 ENCOUNTER — APPOINTMENT (OUTPATIENT)
Dept: PULMONOLOGY | Facility: CLINIC | Age: 85
End: 2020-10-22

## 2021-03-11 NOTE — PATIENT PROFILE ADULT - SURGICAL SITE INCISION
2021       Balta Arana MD  [address] [facility]  [address]  [facility] [address]  Via Fax: [phone]      Patient: Isa Self   YOB: 1953   Date of Visit: 3/11/2021       Dear Dr. Arana:    Thank you for referring Isa Self to me for evaluation. Below are my notes for this visit with her.    If you have questions, please do not hesitate to call me. I look forward to following your patient along with you.      Sincerely,        Terell Sepulveda MD        CC: No Recipients  Terell Sepulveda MD  3/11/2021  9:47 AM  Signed    [facility]  CARDIAC ELECTROPHYSIOLOGY   Progress Note    Name:  Isa Self  : 1953    Date of vist: 3/11/2021  Primary physician: Balta Arana MD    Reason for visit:   Chief Complaint   Patient presents with   • Follow-up       Isa Self is a 67 year old female.  Chief Complaint   Patient presents with   • Follow-up     HPI:      Patient's a 64-year-old f/u for pvc's and htn/lipids, now here for an add-on after a ER visit for hypertension.    Since January she has had a nonhealing ulcer and a lot of leg pain might have been gone to the bone she has an MRI pending.  With this she is got the wound clinic frequently but had a lot of pain and had high blood pressure recently.   Pt saw Latasha LUBIN on 3/2/2021, Irbesartan was increased to 150mg BID.  Pt went to ER 3/4/21 due to elevated BP.  Hydralazine 100mg BID was added.  Pt has wound, and has been having issues controlling the pain for the wound.  Pt was also given Hydrocodone to help with the pain.    She saw primary care cut hydralazine down to 50 twice a day she also hope to get off cholesterol medicine since she is lost weight exercising doing better.  We talked about these issues and in the future decreasing medications.    Originally seen after she was at her allergist using the [place] she has significant asthma history and was noted to have irregular heartbeat so she is  referred for further evaluation.     Years ago when she needed breast surgery and she had history of radiation she had some chest pains and so they did stress test was more than 15 years ago.  Surgery there was notation of some PVCs she reports.  She has never had any lightheadedness dizziness syncope except for slight lightheadedness when standing or quick movement but nothing out of the blue with no fainting or injury no significant palpitations and no recent cardiac tests.   She denies palpitations lightheadedness dizziness syncope she has no more chest pain and nuclear stress test last year was okay and her cough and breathing symptoms of all improved.   cholesterol on October was stable she was on atorvastatin at the time there is some discrepancy as to when it was started   Since she had pelvic prolapse surgery 2 years ago and now has sustained a 20 pounds weight loss and is doing great with healthy eating and exercise.  She wonders if she can get off some medication so I asked her to check her blood pressure at home if it is very well controlled she can follow-up with a pain clinic to possibly have medication is decreased.  CARDIAC HISTORY:   pvc's  htn  hl  CARDIAC TESTING:(tracings and images viewed by myself)   ekg sinus, no pvc's  Echo 12/2016 normal ef 65%  Stress echo 12/2016 normal  ASSESSMENT AND PLAN:   1.  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 Hypertension    Ibesartan was increased on March 2 and hydralazine was added in the ER on March 4.  Blood pressure controlled today  3.  Dyslipidemia    on atorvastatin 10 mg for his very high cholesterol back in 2018 cholesterol is rather low with an HDL of 92 now she wanted go off medication so we will stop it and recheck blood test in 3 months     4. Chest pain - resolved   had chest pain 5/2018   nuclear stress test because she is some calcifications on her CT scan her heart nuclear stress test was okay EF was 40% artificially  slightly lower due to PVCs.    PLAN:    1.  Continue medications, except okay to stop atorvastatin  2.  Recheck cholesterol labs in 3 months  3.  Continue exercise and healthy eating  4.  Follow-up in 4 months to follow-up blood pressure and cholesterol  5.  Monitor blood pressure as an outpatient    Creatinine (no units)   Date Value   01/23/2020 0.95     Potassium (no units)   Date Value   01/23/2020 3.4     HGB (no units)   Date Value   01/23/2020 12.1     Lab Results   Component Value Date    CHOLESTEROL 159 10/04/2018     Lab Results   Component Value Date    CALCLDL 92 10/04/2018     Lab Results   Component Value Date    HDL 47 10/04/2018     Lab Results   Component Value Date    TRIGLYCERIDE 101 10/04/2018     Lab Results   Component Value Date    AST 59 01/23/2020     ALT/SGPT (no units)   Date Value   01/23/2020 68   No results found for: TSH  [facility].       Current Outpatient Medications   Medication Sig Dispense Refill   • hydrALAZINE (APRESOLINE) 100 MG tablet Take 50 mg by mouth 2 times daily.     • HYDROcodone-acetaminophen (NORCO) 5-325 MG per tablet Take 1-2 tablets by mouth as needed.     • sulfamethoxazole-trimethoprim (BACTRIM DS) 800-160 MG per tablet Take 1 tablet by mouth as directed.     • Trelegy Ellipta 200-62.5-25 MCG/INH AEROSOL POWDER, BREATH ACTIVATED      • clobetasol (TEMOVATE) 0.05 % ointment APPLY TO PSORIASIS PLAQUES TWICE DAILY     • Fexofenadine-Pseudoephedrine (ALLEGRA-D 12 HOUR PO) Take 1 tablet by mouth daily.     • triamcinolone (NASACORT ALLERGY 24HR) 55 MCG/ACT nasal inhaler Spray 2 sprays in each nostril daily.     • albuterol (PROAIR HFA) 108 (90 Base) MCG/ACT inhaler Inhale 2 puffs into the lungs.     • benzonatate (TESSALON PERLES) 200 MG capsule Take 200 mg by mouth 2 times daily as needed for Cough.     • irbesartan (AVAPRO) 150 MG tablet Take 1 tablet by mouth 2  times daily. Discontinue Valsartan       No current facility-administered medications for this visit.       Past Medical History:   Diagnosis Date   • Anxiety    • Asthma    • Breast cancer (CMS/HCC)    • Condition not found      shoulder 2016   • Condition not found     knees 1989   • Essential (primary) hypertension    • GERD (gastroesophageal reflux disease)       Social History:  Social History     Tobacco Use   • Smoking status: Never Smoker   • Smokeless tobacco: Never Used   • Tobacco comment:  denies smoking   Substance Use Topics   • Alcohol use: Yes     Alcohol/week: 2.0 standard drinks     Types: 2 Glasses of wine per week     Comment:  drinks socially.   • Drug use: No     Family History  Family History   Problem Relation Age of Onset   • Coronary Artery Disease Neg Hx         premature CAD   • Aneurysm Neg Hx         AAA.        ROS  REVIEW OF SYSTEMS:   GENERAL HEALTH: feels well otherwise  SKIN: denies any unusual skin lesions or rashes  RESPIRATORY: denies shortness of breath with exertion  CARDIOVASCULAR:See HPI  GI: denies abdominal pain and denies heartburn  NEURO: denies headaches  Remainder of review of systems is completed and negative    EXAM:     Visit Vitals  /72 (BP Location: RUE - Right upper extremity, Patient Position: Sitting, Cuff Size: Regular)   Pulse (!) 103   Ht 5' 5.5\" (1.664 m)   Wt 73 kg (161 lb)   SpO2 97%   BMI 26.38 kg/m²     GENERAL: well developed, well nourished,in no apparent distress  SKIN: no rashes,no suspicious lesions  HEENT: atraumatic, normocephalic,ears and throat are clear  NECK: supple,no adenopathy,no bruits  LUNGS: clear to auscultation  CARDIO: regular rate and rhythm,nl S1,S2,no murmur  GI: good BS's,no masses, HSM or tenderness  EXTREMITIES: no cyanosis, clubbing or edema  NEURO: no focal deficits  PSYCH:alert and oriented x3    Terell Sepulveda MD  3/11/2021  9:46 AM              no

## 2022-05-31 NOTE — ED ADULT NURSE NOTE - IN THE PAST 12 MONTHS HAVE YOU USED DRUGS OTHER THAN THOSE REQUIRED FOR MEDICAL REASON?
Is This A New Presentation, Or A Follow-Up?: Skin Lesions
How Severe Is Your Skin Lesion?: moderate
No

## 2022-07-01 NOTE — ED ADULT NURSE NOTE - LOCATION
For information on Fall & Injury Prevention, visit: [email or web address] OR  [email or web address] OR  https://www.cdc.gov/steadi/patient.html lip

## 2022-11-07 NOTE — ED ADULT NURSE NOTE - NS ED NOTE  TALK SOMEONE YN
\"Stacey been feeling bad for two months now. I had cancer in the past. I've throwing up over a month. Now I just throw up certain food. \" No

## 2024-01-31 NOTE — PATIENT PROFILE ADULT - FUNCTIONAL SCREEN CURRENT LEVEL: COMMUNICATION, MLM
Tube Feeding: Isosource 1.5 250ml per feeding 4 times a day via peg tube and flush with 125ml water before and after feedings as tolerated. [id number]  Trach Collar @ 30% oxygen with aerosol and corrugated tubing  Trach Size 6XLT -  Trach care cleaning twice a day           0 = understands/communicates without difficulty
